# Patient Record
Sex: FEMALE | Race: WHITE | NOT HISPANIC OR LATINO | Employment: FULL TIME | URBAN - METROPOLITAN AREA
[De-identification: names, ages, dates, MRNs, and addresses within clinical notes are randomized per-mention and may not be internally consistent; named-entity substitution may affect disease eponyms.]

---

## 2023-06-13 ENCOUNTER — GYNECOLOGY VISIT (OUTPATIENT)
Dept: OBGYN | Facility: CLINIC | Age: 23
End: 2023-06-13
Payer: COMMERCIAL

## 2023-06-13 ENCOUNTER — HOSPITAL ENCOUNTER (OUTPATIENT)
Facility: MEDICAL CENTER | Age: 23
End: 2023-06-13
Attending: PHYSICIAN ASSISTANT
Payer: COMMERCIAL

## 2023-06-13 VITALS — WEIGHT: 242.6 LBS | DIASTOLIC BLOOD PRESSURE: 100 MMHG | SYSTOLIC BLOOD PRESSURE: 140 MMHG

## 2023-06-13 DIAGNOSIS — Z78.9 ATTEMPTING TO CONCEIVE: ICD-10-CM

## 2023-06-13 DIAGNOSIS — N39.41 URGE INCONTINENCE: ICD-10-CM

## 2023-06-13 DIAGNOSIS — N93.9 ABNORMAL UTERINE BLEEDING (AUB): ICD-10-CM

## 2023-06-13 DIAGNOSIS — Z31.69 PRE-CONCEPTION COUNSELING: ICD-10-CM

## 2023-06-13 PROCEDURE — 3077F SYST BP >= 140 MM HG: CPT | Performed by: PHYSICIAN ASSISTANT

## 2023-06-13 PROCEDURE — 3080F DIAST BP >= 90 MM HG: CPT | Performed by: PHYSICIAN ASSISTANT

## 2023-06-13 PROCEDURE — 87086 URINE CULTURE/COLONY COUNT: CPT

## 2023-06-13 PROCEDURE — 99204 OFFICE O/P NEW MOD 45 MIN: CPT | Performed by: PHYSICIAN ASSISTANT

## 2023-06-13 RX ORDER — AMITRIPTYLINE HYDROCHLORIDE 25 MG/1
25 TABLET, FILM COATED ORAL NIGHTLY
COMMUNITY

## 2023-06-13 RX ORDER — OMEPRAZOLE 40 MG/1
40 CAPSULE, DELAYED RELEASE ORAL DAILY
COMMUNITY

## 2023-06-13 RX ORDER — SUMATRIPTAN 100 MG/1
100 TABLET, FILM COATED ORAL
COMMUNITY
End: 2023-12-13

## 2023-06-13 RX ORDER — CYCLOBENZAPRINE HCL 5 MG
5-10 TABLET ORAL 3 TIMES DAILY PRN
COMMUNITY
End: 2023-12-13

## 2023-06-13 NOTE — PROGRESS NOTES
ANNUAL GYNECOLOGY VISIT    Chief Complaint  Irregular Menses and Infertility      Subjective  Gertrudis Mata is a 22 y.o. female No obstetric history on file. Patient's last menstrual period was 05/29/2023 (exact date). using nothing for contraception who presents today for Annual Exam.     TTC  Pt reports she has been trying to conceive x 1 year. Tracking cycles menses are q24-36d lasting 5-8 days with dysmenorrhea and  heavy bleeding the whole cycle. Uses a menstrual cup and pad which she changes 3x daily, cannot use tampons due to heavy madonna. Uses heating pads and NSAIDs with minimal relief. Pt also c/o dyspareunia x 4-5 months. No known trauma to the area. Not tracking ovulation but pays attention to changes in discharge which does not happen at the same time each cycle. Pt does c/o acne and weight gain but no hirsutism or hair loss. Previously on depo, stopped 8/2022.    Urinary sx  Pt c/o urgent and frequent urination with some  urinary leakage, wears liner. Minimal stress incontinence. Unable to completely empty. No hx of UTIs.       Preventive Care   Immunization History   Administered Date(s) Administered    PFIZER GRAY CAP SARS-COV-2 VACCINATION (12+) 01/13/2022    PFIZER PURPLE CAP SARS-COV-2 VACCINATION (12+) 12/17/2021       Guardasil HPV vaccine: UTD    Gynecology History and ROS  Current Sexual Activity: yes - monogamous male partner   History of sexually transmitted diseases? no  Abnormal discharge? no  Current Contraception:  none     Menstrual History  Patient's last menstrual period was 05/29/2023 (exact date).  Periods are irregular  q 24-36 days, lasting 5-8 days.   Clots or heavy flow: yes - whole cycle  Dysmenorrhea: yes - pelvic pain radiating to back and vaginal area  Intermenstrual bleeding/spotting: no  Significant pain with periods:yes - sharp pelvic pain   Bothersome PMS symptoms: no  Significant Pelvic Pain: yes -  with menses only    Pap History  Last pap smear: n/a due to age    History of moderate or severe dysplasia: n/a    Cancer Risk Assessement:  Family history of:   - Breast cancer: MGM   - Ovarian cancer: no   - Uterine cancer: no   - Colon cancer: no   - Endometrial cancer: sister     Obstetric History  OB History    Para Term  AB Living   0 0 0 0 0 0   SAB IAB Ectopic Molar Multiple Live Births   0 0 0 0 0 0       Past Medical History  Past Medical History:   Diagnosis Date    Anxiety     Depression     Joint dislocation     Migraine        Past Surgical History  Past Surgical History:   Procedure Laterality Date    EYE SURGERY Left        Social History  Social History     Tobacco Use    Smoking status: Former     Types: Cigarettes     Passive exposure: Past    Smokeless tobacco: Never   Vaping Use    Vaping Use: Every day    Substances: Nicotine    Devices: Disposable   Substance Use Topics    Drug use: Never        Family History  Family History   Problem Relation Age of Onset    No Known Problems Mother     Endometrial Cancer Sister     Breast Cancer Maternal Grandmother        Home Medications  Current Outpatient Medications   Medication Sig    omeprazole (PRILOSEC) 40 MG delayed-release capsule Take 40 mg by mouth every day.    sumatriptan (IMITREX) 100 MG tablet Take 100 mg by mouth.    cyclobenzaprine (FLEXERIL) 5 mg tablet Take 5-10 mg by mouth 3 times a day as needed.    amitriptyline (ELAVIL) 25 MG Tab Take 25 mg by mouth every evening.       Allergies/Reactions  Not on File    ROS  Positive ROS: see HPI  Gen: no fevers or chills, no significant weight loss or gain, excessive fatigue  Respiratory:  no cough or dyspnea  Cardiac:  no chest pain, no palpitations, no syncope  Breast: no breast discharge, pain, lump or skin changes  GI:  no heartburn, no abdominal pain, no nausea or vomiting  Urinary: no dysuria, urgency, frequency, incontinence   Psych: no depression or anxiety  Neuro: no migraines with aura, fainting spells, numbness or  tingling  Extremities: no joint pain, persistently swollen ankles, recurrent leg cramps      Physical Examination:  Vital Signs: BP (!) 140/100   Wt 242 lb 9.6 oz   LMP 05/29/2023 (Exact Date)       Constitutional: The patient is well developed and well nourished.  Psychiatric: Patient is oriented to time place and person.   Skin: No rash observed.  Neck: Appears symmetric. Thyroid normal size  Respiratory: normal effort  Breast: Inspection reveals no asymetry or nipple discharge, no skin thickening, dimpling or erythema.  Palpation demonstrates no masses.  Abdomen: Soft, non-tender.  Pelvic Exam:      Vulva: external female genitalia are normal in appearance. No lesions     Urethra - no lesions, no erythema     Vagina: moist, pink, normal ruggae     Cervix: pink, smooth, no lesions, no CMT     Uterus - non-tender, normal size, shape, contour, mobile, anteverted     Ovaries: non-tender, no appreciable masses    Pap Smear performed: n/a    Chaperone Present: POP Cuadra   Extremeties: Legs are symmetric and without tenderness. There is no edema present.        Assessment & Plan  Gertrudis Mata is a 22 y.o. female who presents today for Annual Gyn Exam.     1. Abnormal uterine bleeding (AUB)    - Labs ordered to r/o underlying pathology. Reviewed techniques to aid conception - health diet, regular exercise avoid caffeine and alcohol. Discussed methods and frequency for trying and ovulation testing. Encouraged pt to make appointment at [facility].   - CBC WITH DIFFERENTIAL; PROLACTIN; TSH WITH REFLEX TO FT4; ESTRADIOL; FSH/LH; PROGESTERONE; TESTOSTERONE F&T FEMALES/CHILD;   - US-PELVIC COMPLETE (TRANSABDOMINAL/TRANSVAGINAL) (COMBO);     2. Attempting to conceive    - ANTI-MULLERIAN HORMONE([facility]); Future    3. Urge incontinence    - Referral to Urogynecology  - URINE CULTURE(NEW); Future      Anticipatory guidance given. Encouraged adequate water intake, healthy diet, regular  exercise. Educated on Pap smear screening and guidelines for age per ACOG and ASSCP. Advised she will need first Pap at 23y. Discussed safe sex, STI prevention, contraception/family planning. Self breast exam taught.       Return: Annually or PRN    Sharon Reyna P.A.-C.

## 2023-06-16 LAB
BACTERIA UR CULT: NORMAL
SIGNIFICANT IND 70042: NORMAL
SITE SITE: NORMAL
SOURCE SOURCE: NORMAL

## 2023-06-21 ENCOUNTER — TELEPHONE (OUTPATIENT)
Dept: OBGYN | Facility: CLINIC | Age: 23
End: 2023-06-21
Payer: COMMERCIAL

## 2023-06-21 NOTE — TELEPHONE ENCOUNTER
----- Message from Sharon Reyna P.A.-C. sent at 6/16/2023 12:45 PM PDT -----  Please let pt know urine culture was negative. Continue with plan to see Dr Salazar.       2nd attempt  06/21/23  1514 Left message for pt to call back regarding lab results.   6/26/2023   0839 pt called back and left message to call her back.   06/27/23  1041 Left message for pt to call back regarding lab results.   07/03/23  Not able to contact pt. Letter sent with information above.

## 2023-06-21 NOTE — LETTER
July 3, 2023        Gertrudis Mata  [address]  [place] [address]        Dear Gertrudis    Here are the results of your test(s):   Urine culture: negative    Comments:  Please continue with plan to see Dr. Salazar. Please call our office to schedule an appointment. We tried to contact you  but we were unsuccessful. Our phone number is [phone].         Sincerely,    Sharon Chen R.N.  Signed Electronically

## 2023-06-26 ENCOUNTER — TELEPHONE (OUTPATIENT)
Dept: OBGYN | Facility: CLINIC | Age: 23
End: 2023-06-26

## 2023-06-26 NOTE — TELEPHONE ENCOUNTER
Please let pt know urine culture was negative. Continue with plan to see Dr Salazar.     Per Sharon ZAMUDIO     Pt informed and understood

## 2023-07-17 ENCOUNTER — APPOINTMENT (OUTPATIENT)
Dept: RADIOLOGY | Facility: MEDICAL CENTER | Age: 23
End: 2023-07-17
Attending: PHYSICIAN ASSISTANT
Payer: COMMERCIAL

## 2023-07-17 DIAGNOSIS — N93.9 ABNORMAL UTERINE BLEEDING (AUB): ICD-10-CM

## 2023-07-17 PROCEDURE — 76830 TRANSVAGINAL US NON-OB: CPT

## 2023-07-20 ENCOUNTER — TELEPHONE (OUTPATIENT)
Dept: OBGYN | Facility: CLINIC | Age: 23
End: 2023-07-20
Payer: COMMERCIAL

## 2023-07-20 NOTE — TELEPHONE ENCOUNTER
----- Message from Sharon Reyna P.A.-C. sent at 7/19/2023 12:44 PM PDT -----  Please let pt know US looks great, no abnormalities or follow up needed.   I did refer her to Dr Salazar but dont see her scheduled. If she want to schedule with him still for the incontinence please help her with that.   Sharon Reyna P.A.-C.      07/20/23  1326 Left message for pt to call back regarding US results.

## 2023-07-24 NOTE — TELEPHONE ENCOUNTER
Caller Name: Gertrudis Mata   Call Back Number: [phone] (home)       How would the patient prefer to be contacted with a response: Phone call do NOT leave a detailed message    Pt advised of US results. Pt DECLINES incontinence appointment with Dr. Salazar at this time. If you have any questions please reach out to the Pt at the number above. Thank you.

## 2023-08-18 ENCOUNTER — HOSPITAL ENCOUNTER (OUTPATIENT)
Dept: LAB | Facility: MEDICAL CENTER | Age: 23
End: 2023-08-18
Attending: PHYSICIAN ASSISTANT
Payer: COMMERCIAL

## 2023-08-18 LAB
BASOPHILS # BLD AUTO: 0.9 % (ref 0–1.8)
BASOPHILS # BLD: 0.07 K/UL (ref 0–0.12)
EOSINOPHIL # BLD AUTO: 0.18 K/UL (ref 0–0.51)
EOSINOPHIL NFR BLD: 2.3 % (ref 0–6.9)
ERYTHROCYTE [DISTWIDTH] IN BLOOD BY AUTOMATED COUNT: 40.9 FL (ref 35.9–50)
HCT VFR BLD AUTO: 40.2 % (ref 37–47)
HGB BLD-MCNC: 13.4 G/DL (ref 12–16)
IMM GRANULOCYTES # BLD AUTO: 0.02 K/UL (ref 0–0.11)
IMM GRANULOCYTES NFR BLD AUTO: 0.3 % (ref 0–0.9)
LYMPHOCYTES # BLD AUTO: 4.42 K/UL (ref 1–4.8)
LYMPHOCYTES NFR BLD: 55.5 % (ref 22–41)
MCH RBC QN AUTO: 29.3 PG (ref 27–33)
MCHC RBC AUTO-ENTMCNC: 33.3 G/DL (ref 32.2–35.5)
MCV RBC AUTO: 87.8 FL (ref 81.4–97.8)
MONOCYTES # BLD AUTO: 0.51 K/UL (ref 0–0.85)
MONOCYTES NFR BLD AUTO: 6.4 % (ref 0–13.4)
NEUTROPHILS # BLD AUTO: 2.77 K/UL (ref 1.82–7.42)
NEUTROPHILS NFR BLD: 34.6 % (ref 44–72)
NRBC # BLD AUTO: 0 K/UL
NRBC BLD-RTO: 0 /100 WBC (ref 0–0.2)
PLATELET # BLD AUTO: 293 K/UL (ref 164–446)
PMV BLD AUTO: 10.2 FL (ref 9–12.9)
RBC # BLD AUTO: 4.58 M/UL (ref 4.2–5.4)
T4 FREE SERPL-MCNC: 1.16 NG/DL (ref 0.93–1.7)
TSH SERPL DL<=0.005 MIU/L-ACNC: 6.57 UIU/ML (ref 0.38–5.33)
WBC # BLD AUTO: 8 K/UL (ref 4.8–10.8)

## 2023-08-18 PROCEDURE — 82670 ASSAY OF TOTAL ESTRADIOL: CPT

## 2023-08-18 PROCEDURE — 36415 COLL VENOUS BLD VENIPUNCTURE: CPT

## 2023-08-18 PROCEDURE — 84403 ASSAY OF TOTAL TESTOSTERONE: CPT

## 2023-08-18 PROCEDURE — 83001 ASSAY OF GONADOTROPIN (FSH): CPT

## 2023-08-18 PROCEDURE — 84439 ASSAY OF FREE THYROXINE: CPT

## 2023-08-18 PROCEDURE — 83002 ASSAY OF GONADOTROPIN (LH): CPT

## 2023-08-18 PROCEDURE — 84270 ASSAY OF SEX HORMONE GLOBUL: CPT

## 2023-08-18 PROCEDURE — 84144 ASSAY OF PROGESTERONE: CPT

## 2023-08-18 PROCEDURE — 83520 IMMUNOASSAY QUANT NOS NONAB: CPT

## 2023-08-18 PROCEDURE — 84443 ASSAY THYROID STIM HORMONE: CPT

## 2023-08-18 PROCEDURE — 84146 ASSAY OF PROLACTIN: CPT

## 2023-08-18 PROCEDURE — 85025 COMPLETE CBC W/AUTO DIFF WBC: CPT

## 2023-08-18 PROCEDURE — 84402 ASSAY OF FREE TESTOSTERONE: CPT

## 2023-08-19 LAB
ESTRADIOL SERPL-MCNC: 19.1 PG/ML
FSH SERPL-ACNC: 6.9 MIU/ML
LH SERPL-ACNC: 4.2 IU/L
PROGEST SERPL-MCNC: 0.34 NG/ML
PROLACTIN SERPL-MCNC: 22.7 NG/ML (ref 2.8–26)

## 2023-08-23 LAB — MIS SERPL-MCNC: 3.37 NG/ML (ref 0.4–16.02)

## 2023-08-28 LAB
SHBG SERPL-SCNC: 32 NMOL/L (ref 25–122)
TESTOST FREE SERPL-MCNC: 4.5 PG/ML (ref 0.8–7.4)
TESTOST SERPL-MCNC: 27 NG/DL (ref 9–55)

## 2023-08-29 DIAGNOSIS — R79.89 ABNORMAL THYROID BLOOD TEST: ICD-10-CM

## 2023-09-11 ENCOUNTER — TELEPHONE (OUTPATIENT)
Dept: OBGYN | Facility: CLINIC | Age: 23
End: 2023-09-11
Payer: COMMERCIAL

## 2023-09-11 NOTE — TELEPHONE ENCOUNTER
9/11/2023 1425  Pt called to see if she needed a new order for redraw of TSH with reflex to FT4 and when she should get it drawn. Informed pt that there is a new order in for the lab draw and she could go to any [facility] lab to get it drawn and they should be able to access the order. According to the [facility] message on 8/29/2023 by FLORENCIO Kline, informed pt the lab should be drawn 4 weeks from then, near end of September. Pt agreed and verbalized understanding.

## 2023-10-02 ENCOUNTER — APPOINTMENT (OUTPATIENT)
Dept: LAB | Facility: MEDICAL CENTER | Age: 23
End: 2023-10-02
Payer: COMMERCIAL

## 2023-10-17 ENCOUNTER — HOSPITAL ENCOUNTER (OUTPATIENT)
Dept: LAB | Facility: MEDICAL CENTER | Age: 23
End: 2023-10-17
Attending: PHYSICIAN ASSISTANT
Payer: COMMERCIAL

## 2023-10-17 DIAGNOSIS — R79.89 ABNORMAL THYROID BLOOD TEST: ICD-10-CM

## 2023-10-17 LAB — TSH SERPL DL<=0.005 MIU/L-ACNC: 2.86 UIU/ML (ref 0.38–5.33)

## 2023-10-17 PROCEDURE — 36415 COLL VENOUS BLD VENIPUNCTURE: CPT

## 2023-10-17 PROCEDURE — 84443 ASSAY THYROID STIM HORMONE: CPT

## 2023-10-18 ENCOUNTER — TELEPHONE (OUTPATIENT)
Dept: OBGYN | Facility: CLINIC | Age: 23
End: 2023-10-18
Payer: COMMERCIAL

## 2023-10-18 DIAGNOSIS — Z32.01 POSITIVE PREGNANCY TEST: ICD-10-CM

## 2023-10-18 NOTE — PROGRESS NOTES
Pt with positive home pregnancy test, has been TTC for >1y. HCG quant ordered to confirm.   Sharon Reyna P.A.-C.

## 2023-10-18 NOTE — TELEPHONE ENCOUNTER
Pt calling stating she has been having issues conceiving, but she took 2 UPTs at home yesterday and both were Positive and she is a week late on her period. Pt states she saw Sharon Reyna PA-C a few months ago and she is asking if provider can order blood work to confirm she is pregnant. Explained to pt this RN will consult with Sharon Reyna PA-C and will call her rico with an answer. Pt agreed and verbalized understanding.   1100 Consulted with Sharon Reyna PA-C and agreed to order Beta HCG lab work and we will f/u with an US if results double on the second draw.   1104 Left message for pt to call back regarding above message.   10/19/23  1403 Left message for pt to call back regarding above message.   [facility] message sent with above info.   Pt saw [facility] message on 10/19/2023 at 6684

## 2023-10-23 ENCOUNTER — HOSPITAL ENCOUNTER (OUTPATIENT)
Dept: LAB | Facility: MEDICAL CENTER | Age: 23
End: 2023-10-23
Attending: PHYSICIAN ASSISTANT
Payer: COMMERCIAL

## 2023-10-23 DIAGNOSIS — Z32.01 POSITIVE PREGNANCY TEST: ICD-10-CM

## 2023-10-23 LAB — B-HCG SERPL-ACNC: ABNORMAL MIU/ML (ref 0–5)

## 2023-10-23 PROCEDURE — 84702 CHORIONIC GONADOTROPIN TEST: CPT

## 2023-10-23 PROCEDURE — 36415 COLL VENOUS BLD VENIPUNCTURE: CPT

## 2023-10-25 ENCOUNTER — HOSPITAL ENCOUNTER (OUTPATIENT)
Dept: LAB | Facility: MEDICAL CENTER | Age: 23
End: 2023-10-25
Attending: PHYSICIAN ASSISTANT
Payer: COMMERCIAL

## 2023-10-25 DIAGNOSIS — Z32.01 POSITIVE PREGNANCY TEST: ICD-10-CM

## 2023-10-25 LAB — B-HCG SERPL-ACNC: ABNORMAL MIU/ML (ref 0–5)

## 2023-10-25 PROCEDURE — 84702 CHORIONIC GONADOTROPIN TEST: CPT

## 2023-10-25 PROCEDURE — 36415 COLL VENOUS BLD VENIPUNCTURE: CPT

## 2023-10-31 ENCOUNTER — TELEPHONE (OUTPATIENT)
Dept: OBGYN | Facility: CLINIC | Age: 23
End: 2023-10-31
Payer: COMMERCIAL

## 2023-10-31 NOTE — TELEPHONE ENCOUNTER
----- Message from Sharon Reyna P.A.-C. sent at 10/31/2023  9:11 AM PDT -----  It doesn't look like anyone called her on her HCG results while I was gone. Her HCG doubled over 2 days which is great! Can we make her an appt for /NOB at around 8-10 weeks?       10/31/23  1611 Left message for pt to call back regarding lab results.   [facility] message sent with above information.   1630 pt called back and left message to call her back.   1631 called pt back and informed as above. Pt transferred to Debbie ANDRES to schedule /NOB appt. Pt agreed and verbalized understanding.

## 2023-11-16 ENCOUNTER — INITIAL PRENATAL (OUTPATIENT)
Dept: OBGYN | Facility: CLINIC | Age: 23
End: 2023-11-16
Payer: COMMERCIAL

## 2023-11-16 VITALS — DIASTOLIC BLOOD PRESSURE: 83 MMHG | SYSTOLIC BLOOD PRESSURE: 118 MMHG | WEIGHT: 246 LBS

## 2023-11-16 DIAGNOSIS — Z34.81 ENCOUNTER FOR SUPERVISION OF OTHER NORMAL PREGNANCY, FIRST TRIMESTER: ICD-10-CM

## 2023-11-16 DIAGNOSIS — N92.6 MISSED MENSES: Primary | ICD-10-CM

## 2023-11-16 DIAGNOSIS — Z32.01 PREGNANCY TEST POSITIVE: ICD-10-CM

## 2023-11-16 DIAGNOSIS — R79.89 ELEVATED TSH: ICD-10-CM

## 2023-11-16 PROBLEM — O30.049 DICHORIONIC DIAMNIOTIC TWIN GESTATION: Status: ACTIVE | Noted: 2023-11-16

## 2023-11-16 PROCEDURE — 3079F DIAST BP 80-89 MM HG: CPT | Performed by: OBSTETRICS & GYNECOLOGY

## 2023-11-16 PROCEDURE — 99214 OFFICE O/P EST MOD 30 MIN: CPT | Mod: 25 | Performed by: OBSTETRICS & GYNECOLOGY

## 2023-11-16 PROCEDURE — 76830 TRANSVAGINAL US NON-OB: CPT | Performed by: OBSTETRICS & GYNECOLOGY

## 2023-11-16 PROCEDURE — 3074F SYST BP LT 130 MM HG: CPT | Performed by: OBSTETRICS & GYNECOLOGY

## 2023-11-16 NOTE — PROGRESS NOTES
CC: Missed menses and +UPT    Gertrudis Mata is a 23 y.o.  who presents due to missed menses with a +UPT. Patient's last menstrual period was 2023..  She is sure of her LMP.  Based on LMP, she is 9w0d today. She was not using anything for birthcontrol.  This is was a planned pregnancy.  She reports nausea, reports vomiting, denies vaginal bleeding/spotting, and denies cramping.        OB History:    OB History    Para Term  AB Living   1 0 0 0 0 0   SAB IAB Ectopic Molar Multiple Live Births   0 0 0 0 0 0      # Outcome Date GA Lbr Dakota/2nd Weight Sex Delivery Anes PTL Lv   1 Current                    Objective:   Vitals:  /83   Wt 246 lb   There is no height or weight on file to calculate BMI. (Goal BM I>18 <25)  Exam:  General: is in no apparent distress  Psychiatric: appropriate affect, alert and oriented x3, intact judgment and insight.  Respiratory: normal effort  Female GYN: normal female external genitalia without lesions      Procedure:  Transvaginal US performed by me and per my read:    Indication: Amenorrhea, +UPT.     Findings:   Di/di twin intrauterine pregnancy @ 9w3d by CRL.   Positive yolk sacs  Positive fetal cardiac activity    Right ovary wnl.   Left Ovary not visualized .   No free fluid in the cul-de-sac.    Impression:   Viable IUP @ 9w3d.  MEGA by US of 24.  MEGA by LMP: 24  Final MEGA: 24      A/P:   23 y.o.  here for pregnancy confirmation visit    Amenorrhea due to pregnancy.     - US today is c/w LMP. Final MEGA of 24.     - Normal pregnancy s/sx discussed   - Advised prenatal vitamins, healthy well rounded diet, adequate hydration, and continued exercise.     - Labs:     - PNL ordered     - Counseled on aneuploidy and carrier screening tests:   She currently accepted aneuploidy screenings and declined carrier screening   - SAB precautions discussed.   - Discussed the size of our practice and that she will see multiple  providers during the course of her care. She expresses understanding.    - Reviewed starting treatment for routine N/V of pregnancy if present with B6 TID and Unisom nightly. Call for Rx if this is not adequately treating N/V.    2. Di/Di twin gestation    Follow up in 4 weeks for new OB visit.  Needs pap and gc/ct at next visit    Total Time: 30 minutes spent in chart review, exam, counseling and documention      Ivana Pettit D.O.

## 2023-12-13 ENCOUNTER — ROUTINE PRENATAL (OUTPATIENT)
Dept: OBGYN | Facility: CLINIC | Age: 23
End: 2023-12-13
Payer: COMMERCIAL

## 2023-12-13 ENCOUNTER — HOSPITAL ENCOUNTER (OUTPATIENT)
Facility: MEDICAL CENTER | Age: 23
End: 2023-12-13
Attending: OBSTETRICS & GYNECOLOGY
Payer: COMMERCIAL

## 2023-12-13 VITALS
BODY MASS INDEX: 41.66 KG/M2 | DIASTOLIC BLOOD PRESSURE: 86 MMHG | WEIGHT: 244 LBS | HEIGHT: 64 IN | SYSTOLIC BLOOD PRESSURE: 137 MMHG

## 2023-12-13 DIAGNOSIS — O99.210 OBESITY IN PREGNANCY: ICD-10-CM

## 2023-12-13 DIAGNOSIS — M25.20 JOINT LAXITY: ICD-10-CM

## 2023-12-13 DIAGNOSIS — Z12.4 SCREENING FOR MALIGNANT NEOPLASM OF CERVIX: ICD-10-CM

## 2023-12-13 DIAGNOSIS — Z11.3 SCREEN FOR STD (SEXUALLY TRANSMITTED DISEASE): ICD-10-CM

## 2023-12-13 DIAGNOSIS — O30.041 DICHORIONIC DIAMNIOTIC TWIN PREGNANCY IN FIRST TRIMESTER: Primary | ICD-10-CM

## 2023-12-13 DIAGNOSIS — G43.809 OTHER MIGRAINE WITHOUT STATUS MIGRAINOSUS, NOT INTRACTABLE: ICD-10-CM

## 2023-12-13 PROBLEM — G43.909 MIGRAINE WITHOUT STATUS MIGRAINOSUS, NOT INTRACTABLE: Status: ACTIVE | Noted: 2023-12-13

## 2023-12-13 PROCEDURE — 87491 CHLMYD TRACH DNA AMP PROBE: CPT

## 2023-12-13 PROCEDURE — 0501F PRENATAL FLOW SHEET: CPT | Performed by: OBSTETRICS & GYNECOLOGY

## 2023-12-13 PROCEDURE — 88142 CYTOPATH C/V THIN LAYER: CPT

## 2023-12-13 PROCEDURE — 3075F SYST BP GE 130 - 139MM HG: CPT | Performed by: OBSTETRICS & GYNECOLOGY

## 2023-12-13 PROCEDURE — 3079F DIAST BP 80-89 MM HG: CPT | Performed by: OBSTETRICS & GYNECOLOGY

## 2023-12-13 PROCEDURE — 87591 N.GONORRHOEAE DNA AMP PROB: CPT

## 2023-12-13 NOTE — PROGRESS NOTES
Establish Pregnancy Visit    CC: First OB Visit    HPI: Patient is a 23 y.o.  at 12w6d who presents for her first OB visit.  She is not yet feeling fetal movement.  She denies vaginal bleeding, denies leakage of fluid, denies contractions.   She denies nausea/vomiting, headaches, or urinary symptoms.      DATING:   Estimated Date of Delivery: 24  LMP (c/w 9wk US)    GYN HX:   Last Pap: never, age 23  Hx Moderate or Severe Dysplasia : no  Hx STD : no    OBSTETRIC HISTORY:  OB History    Para Term  AB Living   1 0 0 0 0 0   SAB IAB Ectopic Molar Multiple Live Births   0 0 0 0 0 0      # Outcome Date GA Lbr Dakota/2nd Weight Sex Delivery Anes PTL Lv   1 Current                MEDICAL HISTORY:  Past Medical History:   Diagnosis Date    Anxiety     Depression     Joint dislocation     Migraine        MEDICATIONS:  Current Outpatient Medications on File Prior to Visit   Medication Sig Dispense Refill    omeprazole (PRILOSEC) 40 MG delayed-release capsule Take 40 mg by mouth every day.      amitriptyline (ELAVIL) 25 MG Tab Take 25 mg by mouth every evening.       No current facility-administered medications on file prior to visit.       FAMILY HISTORY:  Family History   Problem Relation Age of Onset    No Known Problems Mother     Endometrial Cancer Sister     Breast Cancer Maternal Grandmother        SURGICAL HISTORY:  Past Surgical History:   Procedure Laterality Date    EYE SURGERY Left        ALLERGIES / REACTIONS:  No Known Allergies             SOCIAL HISTORY:   reports that she has quit smoking. Her smoking use included cigarettes. She has been exposed to tobacco smoke. She has never used smokeless tobacco. She reports that she does not currently use alcohol. She reports that she does not use drugs.    ROS:   Gen: no fevers or chills, no significant weight loss or gain, excessive fatigue  Respiratory:  no cough or dyspnea  Cardiac:  no chest pain, no palpitations, no syncope  Breast: no  "breast discharge, pain, lump or skin changes  GI:  no heartburn, no abdominal pain, no nausea or vomiting  Urinary: no dysuria, urgency, frequency, incontinence   Psych: no depression or anxiety  Neuro: no migraines with aura, fainting spells, numbness or tingling  Extremities: no joint pain, persistently swollen ankles, recurrent leg cramps         PHYSICAL EXAMINATION:  Vital Signs:   Vitals:    12/13/23 1352 12/13/23 1413   BP: 137/86    Weight: 244 lb    Height:  5' 4\"     Body mass index is 41.88 kg/m².  Constitutional: The patient is well developed and well nourished.  Psychiatric: Patient is oriented to time place and person.   Skin: No rash observed.  Neck: Neck appears symmetric. There are no masses or adenopathy present.  Respiratory: normal effort  Abdomen: Soft, non-tender.  Pelvic:    Vulva: normal.    Urethra: normal.   Vagina: normal.    Cervix: normal.    Uterus: consistent with dates    Adnexa: normal.   Perineum: normal.   GC / Chlamydia cultures obtained.   Pap Smear Obtained: yes  Extremeties: Legs are symmetric and without tenderness. There is no edema present.    ACOG SCREENING  Infection Prevention  1. High Risk For HIV: No 6. Rash Or Illness Since LMP: No     2. High Risk For Hepatitis B or C: No 7. History Of STD, GC, Chlamydia, HPV Syphilis: No     3. Live With Someone With TB Or Exposed To TB: No 8. Have a cat in the home?: No     4. Patient Or Partner Has A History Of Herpes: No      5. History of Chicken Pox: No             Genetic Screening/Teratology Counseling- Includes patient, baby's father, or anyone in either family with:  Patient's age 35 years or older as of estimated date of delivery: No     Thalassemia (Italian, Greek, Mediterranean, or  background): MCV less than 80: No     Neural tube defect (Meningomyelocele, Spina bifida, or Anencephaly): No     Congenital heart defect: No     Down syndrome: No     Reynold-Sachs (Ashkenazi Mandaen, Cajun, Italian [place] Islander): No     Canavan " disease (Ashkenazi Faith): No     Familial dysautonomia (Ashkenazi Faith): No     Sickle cell disease or trait (): No     Hemophilia or other blood disorders: No     Muscular dystrophy: No    Cystic fibrosis: No     Ramona's chorea: No     Mental retardation/autism: No     Other inherited genetic or chromosomal disorder: No     Maternal metabolic disorder (eg. Type 1 diabetes, PKU): No     Patient or baby's father had child with birth defects not listed above: No     Recurrent pregnancy loss, or a stillbirth: No     Medications (including supplements, vitamins, herbs, or OTC drugs)/illicit/recreational drugs/alcohol since last menstrual period: No                 ASSESSMENT AND PLAN:  23 y.o.  at 12w6d     Pregnancy Problems (from 25 to present)       Problem Noted Resolved    Dichorionic diamniotic twin gestation 2023 by Ivana Pettit D.O. No    Priority:  High      Overview Addendum 2023  2:11 PM by Ivana Pettit D.O.     Recommend daily ASA for risk of preeclampsia (nulliparity, obesity, twins)    MFM referral 2023          Migraine without status migrainosus, not intractable 2023 by Ivana Pettit D.O. No    Priority:  Medium      Overview Signed 2023  2:08 PM by Ivana Pettit D.O.     States that it is controlled by nightly amitriptyline and if she doesn't take it she will inevitably have severe migraine the next day. Reviewed Category C classification with no human studies however may take if benefits outweigh risks.         Obesity in pregnancy 2023 by Ivana Pettit D.O. No    Priority:  Medium      Overview Addendum 2023  2:13 PM by Ivana Pettit D.O.     Pregravid BMI 42  Early gct ordered 2023          Joint laxity 2023 by Ivana Pettit D.O. No    Priority:  Medium      Overview Signed 2023  2:11 PM by Ivana Pettit D.O.     States she partly meets criteria for EDS but was never  officially diagnosed. Complaining of increased laxity already in pregnancy at 12wks. Refer to MFM                 1. Dichorionic diamniotic twin pregnancy in first trimester   - still needs to get PNL and NIPT done. Has lab appt tomorrow. GCT added to labs  - Referral to Perinatology    2. Other migraine without status migrainosus, not intractable  - Referral to Perinatology    3. Obesity in pregnancy  - GLUCOSE 1HR GESTATIONAL; Future  - Referral to Perinatology    4. Joint laxity  - Referral to Perinatology    5. Screening for malignant neoplasm of cervix  - THINPREP PAP W/CTNG; Future    6. Screen for STD (sexually transmitted disease)  - THINPREP PAP W/CTNG; Future        Return in 4 weeks for next prenatal visit    Ivana Pettit D.O.

## 2023-12-15 LAB
C TRACH RRNA CVX QL NAA+PROBE: NEGATIVE
CYTOLOGIST CVX/VAG CYTO: NORMAL
CYTOLOGY CVX/VAG DOC CYTO: NORMAL
N GONORRHOEA RRNA CVX QL NAA+PROBE: NEGATIVE
NOTE NL11727A: NORMAL
OTHER STN SPEC: NORMAL
STAT OF ADQ CVX/VAG CYTO-IMP: NORMAL

## 2023-12-19 ENCOUNTER — HOSPITAL ENCOUNTER (OUTPATIENT)
Dept: LAB | Facility: MEDICAL CENTER | Age: 23
End: 2023-12-19
Attending: OBSTETRICS & GYNECOLOGY
Payer: COMMERCIAL

## 2023-12-19 DIAGNOSIS — R79.89 ELEVATED TSH: ICD-10-CM

## 2023-12-19 DIAGNOSIS — Z32.01 PREGNANCY TEST POSITIVE: ICD-10-CM

## 2023-12-19 DIAGNOSIS — O99.210 OBESITY IN PREGNANCY: ICD-10-CM

## 2023-12-19 LAB
ABO GROUP BLD: NORMAL
BLD GP AB SCN SERPL QL: NORMAL
ERYTHROCYTE [DISTWIDTH] IN BLOOD BY AUTOMATED COUNT: 43.8 FL (ref 35.9–50)
GLUCOSE 1H P 50 G GLC PO SERPL-MCNC: 105 MG/DL (ref 70–139)
HBV SURFACE AG SER QL: ABNORMAL
HCT VFR BLD AUTO: 36 % (ref 37–47)
HCV AB SER QL: ABNORMAL
HGB BLD-MCNC: 12 G/DL (ref 12–16)
HIV 1+2 AB+HIV1 P24 AG SERPL QL IA: NORMAL
MCH RBC QN AUTO: 29.6 PG (ref 27–33)
MCHC RBC AUTO-ENTMCNC: 33.3 G/DL (ref 32.2–35.5)
MCV RBC AUTO: 88.9 FL (ref 81.4–97.8)
PLATELET # BLD AUTO: 272 K/UL (ref 164–446)
PMV BLD AUTO: 10.9 FL (ref 9–12.9)
RBC # BLD AUTO: 4.05 M/UL (ref 4.2–5.4)
RH BLD: NORMAL
RUBV AB SER QL: >500 IU/ML
T PALLIDUM AB SER QL IA: ABNORMAL
TSH SERPL DL<=0.005 MIU/L-ACNC: 2.37 UIU/ML (ref 0.38–5.33)
WBC # BLD AUTO: 10.5 K/UL (ref 4.8–10.8)

## 2023-12-19 PROCEDURE — 86762 RUBELLA ANTIBODY: CPT

## 2023-12-19 PROCEDURE — 36415 COLL VENOUS BLD VENIPUNCTURE: CPT

## 2023-12-19 PROCEDURE — 82950 GLUCOSE TEST: CPT

## 2023-12-19 PROCEDURE — 86850 RBC ANTIBODY SCREEN: CPT

## 2023-12-19 PROCEDURE — 86900 BLOOD TYPING SEROLOGIC ABO: CPT

## 2023-12-19 PROCEDURE — 80307 DRUG TEST PRSMV CHEM ANLYZR: CPT

## 2023-12-19 PROCEDURE — 86803 HEPATITIS C AB TEST: CPT

## 2023-12-19 PROCEDURE — 86780 TREPONEMA PALLIDUM: CPT

## 2023-12-19 PROCEDURE — 87340 HEPATITIS B SURFACE AG IA: CPT

## 2023-12-19 PROCEDURE — 86901 BLOOD TYPING SEROLOGIC RH(D): CPT

## 2023-12-19 PROCEDURE — 87077 CULTURE AEROBIC IDENTIFY: CPT

## 2023-12-19 PROCEDURE — 87086 URINE CULTURE/COLONY COUNT: CPT

## 2023-12-19 PROCEDURE — 85027 COMPLETE CBC AUTOMATED: CPT

## 2023-12-19 PROCEDURE — 87389 HIV-1 AG W/HIV-1&-2 AB AG IA: CPT

## 2023-12-19 PROCEDURE — 84443 ASSAY THYROID STIM HORMONE: CPT

## 2023-12-21 LAB
AMPHET CTO UR CFM-MCNC: NEGATIVE NG/ML
BACTERIA UR CULT: NORMAL
BARBITURATES CTO UR CFM-MCNC: NEGATIVE NG/ML
BENZODIAZ CTO UR CFM-MCNC: NEGATIVE NG/ML
CANNABINOIDS CTO UR CFM-MCNC: NEGATIVE NG/ML
COCAINE CTO UR CFM-MCNC: NEGATIVE NG/ML
CREAT UR-MCNC: 22.8 MG/DL (ref 20–400)
DRUG COMMENT 753798: NORMAL
METHADONE CTO UR CFM-MCNC: NEGATIVE NG/ML
OPIATES CTO UR CFM-MCNC: NEGATIVE NG/ML
PCP CTO UR CFM-MCNC: NEGATIVE NG/ML
PROPOXYPH CTO UR CFM-MCNC: NEGATIVE NG/ML
SIGNIFICANT IND 70042: NORMAL
SITE SITE: NORMAL
SOURCE SOURCE: NORMAL

## 2024-02-06 ENCOUNTER — ANCILLARY PROCEDURE (OUTPATIENT)
Dept: MATERNAL FETAL MEDICINE | Facility: MEDICAL CENTER | Age: 24
End: 2024-02-06
Attending: OBSTETRICS & GYNECOLOGY
Payer: COMMERCIAL

## 2024-02-06 VITALS
WEIGHT: 260 LBS | DIASTOLIC BLOOD PRESSURE: 88 MMHG | SYSTOLIC BLOOD PRESSURE: 145 MMHG | HEART RATE: 99 BPM | BODY MASS INDEX: 44.63 KG/M2

## 2024-02-06 DIAGNOSIS — O30.041 DICHORIONIC DIAMNIOTIC TWIN PREGNANCY IN FIRST TRIMESTER: ICD-10-CM

## 2024-02-06 DIAGNOSIS — G43.809 OTHER MIGRAINE, NOT INTRACTABLE, WITHOUT STATUS MIGRAINOSUS: ICD-10-CM

## 2024-02-06 DIAGNOSIS — O99.210 OBESITY IN PREGNANCY: ICD-10-CM

## 2024-02-06 DIAGNOSIS — M25.20 JOINT LAXITY: ICD-10-CM

## 2024-02-06 PROCEDURE — 76812 OB US DETAILED ADDL FETUS: CPT | Performed by: OBSTETRICS & GYNECOLOGY

## 2024-02-06 PROCEDURE — 99214 OFFICE O/P EST MOD 30 MIN: CPT | Performed by: OBSTETRICS & GYNECOLOGY

## 2024-02-06 PROCEDURE — 76817 TRANSVAGINAL US OBSTETRIC: CPT | Performed by: OBSTETRICS & GYNECOLOGY

## 2024-02-06 PROCEDURE — 76811 OB US DETAILED SNGL FETUS: CPT | Performed by: OBSTETRICS & GYNECOLOGY

## 2024-03-05 ENCOUNTER — ANCILLARY PROCEDURE (OUTPATIENT)
Dept: MATERNAL FETAL MEDICINE | Facility: MEDICAL CENTER | Age: 24
End: 2024-03-05
Attending: OBSTETRICS & GYNECOLOGY
Payer: COMMERCIAL

## 2024-03-05 DIAGNOSIS — O99.210 OBESITY IN PREGNANCY: ICD-10-CM

## 2024-03-05 DIAGNOSIS — M25.20 JOINT LAXITY: ICD-10-CM

## 2024-03-05 DIAGNOSIS — O30.049 DICHORIONIC DIAMNIOTIC TWIN PREGNANCY, ANTEPARTUM: ICD-10-CM

## 2024-03-05 DIAGNOSIS — G43.809 OTHER MIGRAINE, NOT INTRACTABLE, WITHOUT STATUS MIGRAINOSUS: ICD-10-CM

## 2024-03-05 PROCEDURE — 76816 OB US FOLLOW-UP PER FETUS: CPT | Performed by: OBSTETRICS & GYNECOLOGY

## 2024-04-02 ENCOUNTER — APPOINTMENT (OUTPATIENT)
Dept: MATERNAL FETAL MEDICINE | Facility: MEDICAL CENTER | Age: 24
End: 2024-04-02
Attending: OBSTETRICS & GYNECOLOGY
Payer: COMMERCIAL

## 2024-04-02 VITALS — SYSTOLIC BLOOD PRESSURE: 127 MMHG | WEIGHT: 277.1 LBS | DIASTOLIC BLOOD PRESSURE: 98 MMHG | BODY MASS INDEX: 47.56 KG/M2

## 2024-04-02 DIAGNOSIS — O30.049 DICHORIONIC DIAMNIOTIC TWIN PREGNANCY, ANTEPARTUM: ICD-10-CM

## 2024-04-02 DIAGNOSIS — O99.210 OBESITY IN PREGNANCY: ICD-10-CM

## 2024-04-02 DIAGNOSIS — G43.809 OTHER MIGRAINE, NOT INTRACTABLE, WITHOUT STATUS MIGRAINOSUS: ICD-10-CM

## 2024-04-02 DIAGNOSIS — M25.20 JOINT LAXITY: ICD-10-CM

## 2024-04-18 ENCOUNTER — PATIENT MESSAGE (OUTPATIENT)
Dept: OBGYN | Facility: CLINIC | Age: 24
End: 2024-04-18
Payer: COMMERCIAL

## 2024-04-18 DIAGNOSIS — O30.041 DICHORIONIC DIAMNIOTIC TWIN PREGNANCY IN FIRST TRIMESTER: ICD-10-CM

## 2024-04-23 ENCOUNTER — ROUTINE PRENATAL (OUTPATIENT)
Dept: OBGYN | Facility: CLINIC | Age: 24
End: 2024-04-23
Payer: COMMERCIAL

## 2024-04-23 VITALS — DIASTOLIC BLOOD PRESSURE: 88 MMHG | WEIGHT: 285.7 LBS | BODY MASS INDEX: 49.04 KG/M2 | SYSTOLIC BLOOD PRESSURE: 138 MMHG

## 2024-04-23 DIAGNOSIS — O30.043 DICHORIONIC DIAMNIOTIC TWIN PREGNANCY IN THIRD TRIMESTER: Primary | ICD-10-CM

## 2024-04-23 DIAGNOSIS — Z23 NEED FOR TDAP VACCINATION: ICD-10-CM

## 2024-04-23 DIAGNOSIS — O99.210 OBESITY IN PREGNANCY: ICD-10-CM

## 2024-04-23 PROCEDURE — 3079F DIAST BP 80-89 MM HG: CPT | Performed by: OBSTETRICS & GYNECOLOGY

## 2024-04-23 PROCEDURE — 90715 TDAP VACCINE 7 YRS/> IM: CPT | Performed by: OBSTETRICS & GYNECOLOGY

## 2024-04-23 PROCEDURE — 3075F SYST BP GE 130 - 139MM HG: CPT | Performed by: OBSTETRICS & GYNECOLOGY

## 2024-04-23 PROCEDURE — 90471 IMMUNIZATION ADMIN: CPT | Performed by: OBSTETRICS & GYNECOLOGY

## 2024-04-23 PROCEDURE — 0502F SUBSEQUENT PRENATAL CARE: CPT | Performed by: OBSTETRICS & GYNECOLOGY

## 2024-04-23 NOTE — PROGRESS NOTES
Pt. Here for OB/FU.   Reports Good FM.   Pt. Denies VB, LOF, or UC's.   Kick count: explained and gave sheet  Tdap: wants Tdap done today   Pt states no concerns   Phone/Pharm verified.

## 2024-04-23 NOTE — PROGRESS NOTES
Pt was here for a Tdap injection  NDC: 94322-522-31  Lot #:[id number]  Exp:2026  Dose:0.5 ml  Site: Right deltoid   Pt Educated, Name and  verified  Prior injection? Yes  Pt tolerated? Yes  Verified by:IP  Administered by: SHAYLA

## 2024-04-23 NOTE — LETTER
"Count Your Baby's Movements  Another step to a healthy delivery    Gertrudis Mata  [facility]  Dept: [phone]    How Many Weeks Pregnant: 31w5d    Date to Begin Countin23              How to use this chart    One way for your physician to keep track of your baby's health is by knowing how often the baby moves (or \"kicks\") in your womb.  You can help your physician to do this by using this chart every day.    Every day, you should see how many hours it takes for your baby to move 10 times.  Start in the morning, as soon as you get up.    First, write down the time your baby moves until you get to 10.  Check off one box every time your baby moves until you get to 10.  Write down the time you finished counting in the last column.  Total how long it took to count up all 10 movements.  Finally, fill in the box that shows how long this took.  After counting 10 movements, you no longer have to count any more that day.  The next morning, just start counting again as soon as you get up.    What should you call a \"movement\"?  It is hard to say, because it will feel different from one mother to another and from one pregnancy to the next.  The important thing is that you count the movements the same way throughout your pregnancy.  If you have more questions, you should ask your physician.    Count carefully every day!  SAMPLE:  Week 28    How many hours did it take to feel 10 movements?       Start  Time     1     2     3     4     5     6     7     8     9     10   Finish Time   Mon 8:20           11:40                  Fri               Sat               Sun                 IMPORTANT: You should contact your physician if it takes more than two hours for you to feel 10 movements.  Each morning, write down the time and start to count the movements of your baby.  Keep track by checking off one box every time you feel one movement.  When you " "have felt 10 \"kicks\", write down the time you finished counting in the last column.  Then fill in the   box (over the check valentino) for the number of hours it took.  Be sure to read the complete instructions on the previous page.            "

## 2024-04-29 ENCOUNTER — HOSPITAL ENCOUNTER (OUTPATIENT)
Dept: LAB | Facility: MEDICAL CENTER | Age: 24
End: 2024-04-29
Attending: OBSTETRICS & GYNECOLOGY
Payer: COMMERCIAL

## 2024-04-29 DIAGNOSIS — O30.041 DICHORIONIC DIAMNIOTIC TWIN PREGNANCY IN FIRST TRIMESTER: ICD-10-CM

## 2024-04-29 LAB
ERYTHROCYTE [DISTWIDTH] IN BLOOD BY AUTOMATED COUNT: 41.2 FL (ref 35.9–50)
GLUCOSE 1H P 50 G GLC PO SERPL-MCNC: 121 MG/DL (ref 70–139)
HCT VFR BLD AUTO: 33.7 % (ref 37–47)
HGB BLD-MCNC: 11.1 G/DL (ref 12–16)
MCH RBC QN AUTO: 28.5 PG (ref 27–33)
MCHC RBC AUTO-ENTMCNC: 32.9 G/DL (ref 32.2–35.5)
MCV RBC AUTO: 86.6 FL (ref 81.4–97.8)
PLATELET # BLD AUTO: 280 K/UL (ref 164–446)
PMV BLD AUTO: 11.6 FL (ref 9–12.9)
RBC # BLD AUTO: 3.89 M/UL (ref 4.2–5.4)
T PALLIDUM AB SER QL IA: NORMAL
WBC # BLD AUTO: 12.2 K/UL (ref 4.8–10.8)

## 2024-04-29 PROCEDURE — 85027 COMPLETE CBC AUTOMATED: CPT

## 2024-04-29 PROCEDURE — 36415 COLL VENOUS BLD VENIPUNCTURE: CPT

## 2024-04-29 PROCEDURE — 86780 TREPONEMA PALLIDUM: CPT

## 2024-04-29 PROCEDURE — 82950 GLUCOSE TEST: CPT

## 2024-04-30 ENCOUNTER — ANCILLARY PROCEDURE (OUTPATIENT)
Dept: MATERNAL FETAL MEDICINE | Facility: MEDICAL CENTER | Age: 24
End: 2024-04-30
Attending: OBSTETRICS & GYNECOLOGY
Payer: COMMERCIAL

## 2024-04-30 VITALS
WEIGHT: 290.6 LBS | BODY MASS INDEX: 49.88 KG/M2 | DIASTOLIC BLOOD PRESSURE: 84 MMHG | HEART RATE: 90 BPM | SYSTOLIC BLOOD PRESSURE: 138 MMHG

## 2024-04-30 DIAGNOSIS — G43.809 OTHER MIGRAINE, NOT INTRACTABLE, WITHOUT STATUS MIGRAINOSUS: ICD-10-CM

## 2024-04-30 DIAGNOSIS — O99.210 OBESITY IN PREGNANCY: ICD-10-CM

## 2024-04-30 DIAGNOSIS — M25.20 JOINT LAXITY: ICD-10-CM

## 2024-04-30 DIAGNOSIS — G43.809 OTHER MIGRAINE WITHOUT STATUS MIGRAINOSUS, NOT INTRACTABLE: ICD-10-CM

## 2024-04-30 DIAGNOSIS — O30.049 DICHORIONIC DIAMNIOTIC TWIN PREGNANCY, ANTEPARTUM: ICD-10-CM

## 2024-05-06 ENCOUNTER — ROUTINE PRENATAL (OUTPATIENT)
Dept: OBGYN | Facility: CLINIC | Age: 24
End: 2024-05-06
Payer: COMMERCIAL

## 2024-05-06 VITALS — DIASTOLIC BLOOD PRESSURE: 88 MMHG | BODY MASS INDEX: 49.28 KG/M2 | WEIGHT: 287.1 LBS | SYSTOLIC BLOOD PRESSURE: 137 MMHG

## 2024-05-06 DIAGNOSIS — O30.043 DICHORIONIC DIAMNIOTIC TWIN PREGNANCY IN THIRD TRIMESTER: ICD-10-CM

## 2024-05-06 PROCEDURE — 59025 FETAL NON-STRESS TEST: CPT | Performed by: OBSTETRICS & GYNECOLOGY

## 2024-05-06 PROCEDURE — 0502F SUBSEQUENT PRENATAL CARE: CPT | Performed by: OBSTETRICS & GYNECOLOGY

## 2024-05-06 NOTE — PROGRESS NOTES
Gertrudis Nicole Mata, a  at 33w4d with an MEGA of 2024, by Last Menstrual Period, was seen at [facility] for a nonstress test.                  NST:   A: Baseline 120 with moderate variability, accelerations present, no decelerations noted.  Reactive NST.  Indication obesity affecting pregnancy and Di Di twin gestation.  B: Baseline 120 with moderate variability, accelerations present, no decelerations noted.  Reactive NST.  Indication same as above.

## 2024-05-13 ENCOUNTER — OFFICE VISIT (OUTPATIENT)
Dept: OBGYN | Facility: CLINIC | Age: 24
End: 2024-05-13
Payer: COMMERCIAL

## 2024-05-13 VITALS — WEIGHT: 293 LBS | SYSTOLIC BLOOD PRESSURE: 129 MMHG | DIASTOLIC BLOOD PRESSURE: 65 MMHG | BODY MASS INDEX: 50.98 KG/M2

## 2024-05-13 DIAGNOSIS — O30.043 DICHORIONIC DIAMNIOTIC TWIN PREGNANCY IN THIRD TRIMESTER: ICD-10-CM

## 2024-05-13 PROCEDURE — 59025 FETAL NON-STRESS TEST: CPT | Performed by: OBSTETRICS & GYNECOLOGY

## 2024-05-13 NOTE — PROCEDURES
NST: A Baseline 130 with moderate variability, accelerations present, no decelerations noted.  Reactive NST.  Indication Di Di twin gestation.  B baseline 130 with moderate very below the accelerations present no decelerations noted.  Reactive NST.  Indication Di Di twin gestation.

## 2024-05-20 ENCOUNTER — OFFICE VISIT (OUTPATIENT)
Dept: OBGYN | Facility: CLINIC | Age: 24
End: 2024-05-20
Payer: COMMERCIAL

## 2024-05-20 VITALS — DIASTOLIC BLOOD PRESSURE: 72 MMHG | SYSTOLIC BLOOD PRESSURE: 139 MMHG | WEIGHT: 293 LBS | BODY MASS INDEX: 51.67 KG/M2

## 2024-05-20 DIAGNOSIS — O30.043 DICHORIONIC DIAMNIOTIC TWIN PREGNANCY IN THIRD TRIMESTER: ICD-10-CM

## 2024-05-20 PROCEDURE — 3078F DIAST BP <80 MM HG: CPT | Performed by: OBSTETRICS & GYNECOLOGY

## 2024-05-20 PROCEDURE — 59025 FETAL NON-STRESS TEST: CPT | Performed by: OBSTETRICS & GYNECOLOGY

## 2024-05-20 PROCEDURE — 3075F SYST BP GE 130 - 139MM HG: CPT | Performed by: OBSTETRICS & GYNECOLOGY

## 2024-05-20 NOTE — PROGRESS NOTES
NST indications-Di Di twins  Accelerations-present  Decelerations-absent  Baseline 120-130  NST interpretation; reactive nonstress test

## 2024-05-25 ENCOUNTER — ANESTHESIA (OUTPATIENT)
Dept: OBGYN | Facility: MEDICAL CENTER | Age: 24
End: 2024-05-25
Payer: COMMERCIAL

## 2024-05-25 ENCOUNTER — ANESTHESIA EVENT (OUTPATIENT)
Dept: OBGYN | Facility: MEDICAL CENTER | Age: 24
End: 2024-05-25
Payer: COMMERCIAL

## 2024-05-25 ENCOUNTER — HOSPITAL ENCOUNTER (INPATIENT)
Facility: MEDICAL CENTER | Age: 24
LOS: 2 days | End: 2024-05-27
Attending: OBSTETRICS & GYNECOLOGY | Admitting: OBSTETRICS & GYNECOLOGY
Payer: COMMERCIAL

## 2024-05-25 DIAGNOSIS — G89.18 ACUTE POSTOPERATIVE PAIN: ICD-10-CM

## 2024-05-25 DIAGNOSIS — I10 HYPERTENSION, UNSPECIFIED TYPE: ICD-10-CM

## 2024-05-25 LAB
ALBUMIN SERPL BCP-MCNC: 3.1 G/DL (ref 3.2–4.9)
ALBUMIN/GLOB SERPL: 1.1 G/DL
ALP SERPL-CCNC: 344 U/L (ref 30–99)
ALT SERPL-CCNC: 13 U/L (ref 2–50)
ANION GAP SERPL CALC-SCNC: 12 MMOL/L (ref 7–16)
AST SERPL-CCNC: 17 U/L (ref 12–45)
BASOPHILS # BLD AUTO: 0.5 % (ref 0–1.8)
BASOPHILS # BLD: 0.06 K/UL (ref 0–0.12)
BILIRUB SERPL-MCNC: <0.2 MG/DL (ref 0.1–1.5)
BUN SERPL-MCNC: 14 MG/DL (ref 8–22)
CALCIUM ALBUM COR SERPL-MCNC: 9.3 MG/DL (ref 8.5–10.5)
CALCIUM SERPL-MCNC: 8.6 MG/DL (ref 8.5–10.5)
CHLORIDE SERPL-SCNC: 108 MMOL/L (ref 96–112)
CO2 SERPL-SCNC: 16 MMOL/L (ref 20–33)
CREAT SERPL-MCNC: 0.55 MG/DL (ref 0.5–1.4)
CRYSTALS AMN MICRO: NORMAL
EOSINOPHIL # BLD AUTO: 0.19 K/UL (ref 0–0.51)
EOSINOPHIL NFR BLD: 1.6 % (ref 0–6.9)
ERYTHROCYTE [DISTWIDTH] IN BLOOD BY AUTOMATED COUNT: 41.6 FL (ref 35.9–50)
GFR SERPLBLD CREATININE-BSD FMLA CKD-EPI: 133 ML/MIN/1.73 M 2
GLOBULIN SER CALC-MCNC: 2.7 G/DL (ref 1.9–3.5)
GLUCOSE SERPL-MCNC: 76 MG/DL (ref 65–99)
HCT VFR BLD AUTO: 31.4 % (ref 37–47)
HGB BLD-MCNC: 10.8 G/DL (ref 12–16)
HOLDING TUBE BB 8507: NORMAL
IMM GRANULOCYTES # BLD AUTO: 0.07 K/UL (ref 0–0.11)
IMM GRANULOCYTES NFR BLD AUTO: 0.6 % (ref 0–0.9)
LYMPHOCYTES # BLD AUTO: 3.18 K/UL (ref 1–4.8)
LYMPHOCYTES NFR BLD: 26.6 % (ref 22–41)
MCH RBC QN AUTO: 28.5 PG (ref 27–33)
MCHC RBC AUTO-ENTMCNC: 34.4 G/DL (ref 32.2–35.5)
MCV RBC AUTO: 82.8 FL (ref 81.4–97.8)
MONOCYTES # BLD AUTO: 0.93 K/UL (ref 0–0.85)
MONOCYTES NFR BLD AUTO: 7.8 % (ref 0–13.4)
NEUTROPHILS # BLD AUTO: 7.52 K/UL (ref 1.82–7.42)
NEUTROPHILS NFR BLD: 62.9 % (ref 44–72)
NRBC # BLD AUTO: 0 K/UL
NRBC BLD-RTO: 0 /100 WBC (ref 0–0.2)
PLATELET # BLD AUTO: 234 K/UL (ref 164–446)
PMV BLD AUTO: 11.7 FL (ref 9–12.9)
POTASSIUM SERPL-SCNC: 4.4 MMOL/L (ref 3.6–5.5)
PROT SERPL-MCNC: 5.8 G/DL (ref 6–8.2)
RBC # BLD AUTO: 3.79 M/UL (ref 4.2–5.4)
SODIUM SERPL-SCNC: 136 MMOL/L (ref 135–145)
T PALLIDUM AB SER QL IA: NORMAL
WBC # BLD AUTO: 12 K/UL (ref 4.8–10.8)

## 2024-05-25 PROCEDURE — 160047 HCHG PACU  - EA ADDL 30 MINS PHASE II: Performed by: OBSTETRICS & GYNECOLOGY

## 2024-05-25 PROCEDURE — 85025 COMPLETE CBC W/AUTO DIFF WBC: CPT

## 2024-05-25 PROCEDURE — 160029 HCHG SURGERY MINUTES - 1ST 30 MINS LEVEL 4: Performed by: OBSTETRICS & GYNECOLOGY

## 2024-05-25 PROCEDURE — 80053 COMPREHEN METABOLIC PANEL: CPT

## 2024-05-25 PROCEDURE — 160041 HCHG SURGERY MINUTES - EA ADDL 1 MIN LEVEL 4: Performed by: OBSTETRICS & GYNECOLOGY

## 2024-05-25 PROCEDURE — 160035 HCHG PACU - 1ST 60 MINS PHASE I: Performed by: OBSTETRICS & GYNECOLOGY

## 2024-05-25 PROCEDURE — 85027 COMPLETE CBC AUTOMATED: CPT

## 2024-05-25 PROCEDURE — 89060 EXAM SYNOVIAL FLUID CRYSTALS: CPT

## 2024-05-25 PROCEDURE — 160009 HCHG ANES TIME/MIN: Performed by: OBSTETRICS & GYNECOLOGY

## 2024-05-25 PROCEDURE — 770002 HCHG ROOM/CARE - OB PRIVATE (112)

## 2024-05-25 PROCEDURE — 160025 RECOVERY II MINUTES (STATS): Performed by: OBSTETRICS & GYNECOLOGY

## 2024-05-25 PROCEDURE — 160048 HCHG OR STATISTICAL LEVEL 1-5: Performed by: OBSTETRICS & GYNECOLOGY

## 2024-05-25 PROCEDURE — A9270 NON-COVERED ITEM OR SERVICE: HCPCS | Performed by: ANESTHESIOLOGY

## 2024-05-25 PROCEDURE — 700111 HCHG RX REV CODE 636 W/ 250 OVERRIDE (IP): Performed by: OBSTETRICS & GYNECOLOGY

## 2024-05-25 PROCEDURE — 36415 COLL VENOUS BLD VENIPUNCTURE: CPT

## 2024-05-25 PROCEDURE — 160046 HCHG PACU - 1ST 60 MINS PHASE II: Performed by: OBSTETRICS & GYNECOLOGY

## 2024-05-25 PROCEDURE — 700111 HCHG RX REV CODE 636 W/ 250 OVERRIDE (IP): Mod: JZ

## 2024-05-25 PROCEDURE — 700102 HCHG RX REV CODE 250 W/ 637 OVERRIDE(OP): Performed by: ANESTHESIOLOGY

## 2024-05-25 PROCEDURE — C1755 CATHETER, INTRASPINAL: HCPCS | Performed by: OBSTETRICS & GYNECOLOGY

## 2024-05-25 PROCEDURE — 160002 HCHG RECOVERY MINUTES (STAT): Performed by: OBSTETRICS & GYNECOLOGY

## 2024-05-25 PROCEDURE — 86780 TREPONEMA PALLIDUM: CPT

## 2024-05-25 PROCEDURE — 700111 HCHG RX REV CODE 636 W/ 250 OVERRIDE (IP): Performed by: ANESTHESIOLOGY

## 2024-05-25 PROCEDURE — 700105 HCHG RX REV CODE 258: Performed by: OBSTETRICS & GYNECOLOGY

## 2024-05-25 PROCEDURE — 302449 STATCHG TRIAGE ONLY (STATISTIC)

## 2024-05-25 PROCEDURE — 700105 HCHG RX REV CODE 258

## 2024-05-25 PROCEDURE — 59515 CESAREAN DELIVERY: CPT | Performed by: OBSTETRICS & GYNECOLOGY

## 2024-05-25 RX ORDER — KETOROLAC TROMETHAMINE 15 MG/ML
INJECTION, SOLUTION INTRAMUSCULAR; INTRAVENOUS PRN
Status: DISCONTINUED | OUTPATIENT
Start: 2024-05-25 | End: 2024-05-25 | Stop reason: SURG

## 2024-05-25 RX ORDER — METOCLOPRAMIDE HYDROCHLORIDE 5 MG/ML
10 INJECTION INTRAMUSCULAR; INTRAVENOUS ONCE
Status: COMPLETED | OUTPATIENT
Start: 2024-05-25 | End: 2024-05-25

## 2024-05-25 RX ORDER — ONDANSETRON 2 MG/ML
4 INJECTION INTRAMUSCULAR; INTRAVENOUS EVERY 6 HOURS PRN
Status: DISCONTINUED | OUTPATIENT
Start: 2024-05-25 | End: 2024-05-25 | Stop reason: HOSPADM

## 2024-05-25 RX ORDER — CARBOPROST TROMETHAMINE 250 UG/ML
250 INJECTION, SOLUTION INTRAMUSCULAR
Status: DISCONTINUED | OUTPATIENT
Start: 2024-05-25 | End: 2024-05-25 | Stop reason: HOSPADM

## 2024-05-25 RX ORDER — MORPHINE SULFATE 0.5 MG/ML
INJECTION, SOLUTION EPIDURAL; INTRATHECAL; INTRAVENOUS
Status: COMPLETED | OUTPATIENT
Start: 2024-05-25 | End: 2024-05-25

## 2024-05-25 RX ORDER — OXYCODONE HYDROCHLORIDE 5 MG/1
5 TABLET ORAL EVERY 4 HOURS PRN
Status: DISCONTINUED | OUTPATIENT
Start: 2024-05-26 | End: 2024-05-27 | Stop reason: HOSPADM

## 2024-05-25 RX ORDER — EPHEDRINE SULFATE 50 MG/ML
5 INJECTION, SOLUTION INTRAVENOUS
Status: DISCONTINUED | OUTPATIENT
Start: 2024-05-25 | End: 2024-05-25 | Stop reason: HOSPADM

## 2024-05-25 RX ORDER — ONDANSETRON 4 MG/1
4 TABLET, ORALLY DISINTEGRATING ORAL EVERY 6 HOURS PRN
Status: DISCONTINUED | OUTPATIENT
Start: 2024-05-25 | End: 2024-05-25 | Stop reason: HOSPADM

## 2024-05-25 RX ORDER — SIMETHICONE 125 MG
125 TABLET,CHEWABLE ORAL 4 TIMES DAILY PRN
Status: DISCONTINUED | OUTPATIENT
Start: 2024-05-25 | End: 2024-05-27 | Stop reason: HOSPADM

## 2024-05-25 RX ORDER — OXYCODONE HYDROCHLORIDE 10 MG/1
10 TABLET ORAL EVERY 4 HOURS PRN
Status: DISCONTINUED | OUTPATIENT
Start: 2024-05-26 | End: 2024-05-27 | Stop reason: HOSPADM

## 2024-05-25 RX ORDER — LIDOCAINE HYDROCHLORIDE 10 MG/ML
20 INJECTION, SOLUTION INFILTRATION; PERINEURAL
Status: DISCONTINUED | OUTPATIENT
Start: 2024-05-25 | End: 2024-05-25 | Stop reason: HOSPADM

## 2024-05-25 RX ORDER — ALUMINA, MAGNESIA, AND SIMETHICONE 2400; 2400; 240 MG/30ML; MG/30ML; MG/30ML
30 SUSPENSION ORAL EVERY 6 HOURS PRN
Status: DISCONTINUED | OUTPATIENT
Start: 2024-05-25 | End: 2024-05-25 | Stop reason: HOSPADM

## 2024-05-25 RX ORDER — DIPHENHYDRAMINE HYDROCHLORIDE 50 MG/ML
12.5 INJECTION INTRAMUSCULAR; INTRAVENOUS EVERY 6 HOURS PRN
Status: ACTIVE | OUTPATIENT
Start: 2024-05-25 | End: 2024-05-26

## 2024-05-25 RX ORDER — ONDANSETRON 2 MG/ML
4 INJECTION INTRAMUSCULAR; INTRAVENOUS EVERY 6 HOURS PRN
Status: ACTIVE | OUTPATIENT
Start: 2024-05-25 | End: 2024-05-26

## 2024-05-25 RX ORDER — HYDROMORPHONE HYDROCHLORIDE 1 MG/ML
0.4 INJECTION, SOLUTION INTRAMUSCULAR; INTRAVENOUS; SUBCUTANEOUS
Status: DISCONTINUED | OUTPATIENT
Start: 2024-05-25 | End: 2024-05-25 | Stop reason: HOSPADM

## 2024-05-25 RX ORDER — OXYCODONE HYDROCHLORIDE 5 MG/1
5 TABLET ORAL EVERY 4 HOURS PRN
Status: ACTIVE | OUTPATIENT
Start: 2024-05-25 | End: 2024-05-26

## 2024-05-25 RX ORDER — KETOROLAC TROMETHAMINE 15 MG/ML
15 INJECTION, SOLUTION INTRAMUSCULAR; INTRAVENOUS EVERY 6 HOURS
Status: COMPLETED | OUTPATIENT
Start: 2024-05-25 | End: 2024-05-26

## 2024-05-25 RX ORDER — OXYTOCIN 10 [USP'U]/ML
10 INJECTION, SOLUTION INTRAMUSCULAR; INTRAVENOUS
Status: DISCONTINUED | OUTPATIENT
Start: 2024-05-25 | End: 2024-05-25 | Stop reason: HOSPADM

## 2024-05-25 RX ORDER — HYDROMORPHONE HYDROCHLORIDE 1 MG/ML
0.1 INJECTION, SOLUTION INTRAMUSCULAR; INTRAVENOUS; SUBCUTANEOUS
Status: DISCONTINUED | OUTPATIENT
Start: 2024-05-25 | End: 2024-05-25 | Stop reason: HOSPADM

## 2024-05-25 RX ORDER — SODIUM CHLORIDE, SODIUM LACTATE, POTASSIUM CHLORIDE, CALCIUM CHLORIDE 600; 310; 30; 20 MG/100ML; MG/100ML; MG/100ML; MG/100ML
INJECTION, SOLUTION INTRAVENOUS PRN
Status: DISCONTINUED | OUTPATIENT
Start: 2024-05-25 | End: 2024-05-27 | Stop reason: HOSPADM

## 2024-05-25 RX ORDER — SODIUM CHLORIDE, SODIUM GLUCONATE, SODIUM ACETATE, POTASSIUM CHLORIDE AND MAGNESIUM CHLORIDE 526; 502; 368; 37; 30 MG/100ML; MG/100ML; MG/100ML; MG/100ML; MG/100ML
1500 INJECTION, SOLUTION INTRAVENOUS ONCE
Status: COMPLETED | OUTPATIENT
Start: 2024-05-25 | End: 2024-05-25

## 2024-05-25 RX ORDER — ACETAMINOPHEN 500 MG
1000 TABLET ORAL EVERY 6 HOURS
Qty: 24 TABLET | Refills: 0 | Status: DISCONTINUED | OUTPATIENT
Start: 2024-05-26 | End: 2024-05-27 | Stop reason: HOSPADM

## 2024-05-25 RX ORDER — DIPHENHYDRAMINE HYDROCHLORIDE 50 MG/ML
25 INJECTION INTRAMUSCULAR; INTRAVENOUS EVERY 6 HOURS PRN
Status: DISCONTINUED | OUTPATIENT
Start: 2024-05-26 | End: 2024-05-27 | Stop reason: HOSPADM

## 2024-05-25 RX ORDER — POLYETHYLENE GLYCOL 3350 17 G/17G
1 POWDER, FOR SOLUTION ORAL
Status: DISCONTINUED | OUTPATIENT
Start: 2024-05-25 | End: 2024-05-27 | Stop reason: HOSPADM

## 2024-05-25 RX ORDER — DIPHENHYDRAMINE HCL 25 MG
25 TABLET ORAL EVERY 6 HOURS PRN
Status: DISCONTINUED | OUTPATIENT
Start: 2024-05-26 | End: 2024-05-27 | Stop reason: HOSPADM

## 2024-05-25 RX ORDER — DIPHENHYDRAMINE HYDROCHLORIDE 50 MG/ML
12.5 INJECTION INTRAMUSCULAR; INTRAVENOUS
Status: DISCONTINUED | OUTPATIENT
Start: 2024-05-25 | End: 2024-05-25 | Stop reason: HOSPADM

## 2024-05-25 RX ORDER — OXYCODONE HYDROCHLORIDE AND ACETAMINOPHEN 5; 325 MG/1; MG/1
2 TABLET ORAL
Status: COMPLETED | OUTPATIENT
Start: 2024-05-25 | End: 2024-05-25

## 2024-05-25 RX ORDER — SODIUM CHLORIDE, SODIUM GLUCONATE, SODIUM ACETATE, POTASSIUM CHLORIDE AND MAGNESIUM CHLORIDE 526; 502; 368; 37; 30 MG/100ML; MG/100ML; MG/100ML; MG/100ML; MG/100ML
500 INJECTION, SOLUTION INTRAVENOUS CONTINUOUS
Status: DISCONTINUED | OUTPATIENT
Start: 2024-05-25 | End: 2024-05-27 | Stop reason: HOSPADM

## 2024-05-25 RX ORDER — HYDROMORPHONE HYDROCHLORIDE 1 MG/ML
0.2 INJECTION, SOLUTION INTRAMUSCULAR; INTRAVENOUS; SUBCUTANEOUS
Status: DISCONTINUED | OUTPATIENT
Start: 2024-05-25 | End: 2024-05-25 | Stop reason: HOSPADM

## 2024-05-25 RX ORDER — SODIUM CHLORIDE, SODIUM LACTATE, POTASSIUM CHLORIDE, CALCIUM CHLORIDE 600; 310; 30; 20 MG/100ML; MG/100ML; MG/100ML; MG/100ML
INJECTION, SOLUTION INTRAVENOUS CONTINUOUS
Status: DISCONTINUED | OUTPATIENT
Start: 2024-05-25 | End: 2024-05-27 | Stop reason: HOSPADM

## 2024-05-25 RX ORDER — ACETAMINOPHEN 500 MG
1000 TABLET ORAL
Status: DISCONTINUED | OUTPATIENT
Start: 2024-05-25 | End: 2024-05-25 | Stop reason: HOSPADM

## 2024-05-25 RX ORDER — ACETAMINOPHEN 500 MG
1000 TABLET ORAL EVERY 6 HOURS PRN
Status: DISCONTINUED | OUTPATIENT
Start: 2024-05-29 | End: 2024-05-27 | Stop reason: HOSPADM

## 2024-05-25 RX ORDER — KETOROLAC TROMETHAMINE 15 MG/ML
15 INJECTION, SOLUTION INTRAMUSCULAR; INTRAVENOUS EVERY 6 HOURS
Status: DISCONTINUED | OUTPATIENT
Start: 2024-05-25 | End: 2024-05-25

## 2024-05-25 RX ORDER — DEXAMETHASONE SODIUM PHOSPHATE 4 MG/ML
INJECTION, SOLUTION INTRA-ARTICULAR; INTRALESIONAL; INTRAMUSCULAR; INTRAVENOUS; SOFT TISSUE PRN
Status: DISCONTINUED | OUTPATIENT
Start: 2024-05-25 | End: 2024-05-25 | Stop reason: SURG

## 2024-05-25 RX ORDER — OXYCODONE HYDROCHLORIDE AND ACETAMINOPHEN 5; 325 MG/1; MG/1
1 TABLET ORAL
Status: COMPLETED | OUTPATIENT
Start: 2024-05-25 | End: 2024-05-25

## 2024-05-25 RX ORDER — BUPIVACAINE HYDROCHLORIDE 7.5 MG/ML
INJECTION, SOLUTION INTRASPINAL
Status: COMPLETED | OUTPATIENT
Start: 2024-05-25 | End: 2024-05-25

## 2024-05-25 RX ORDER — ASPIRIN 81 MG/1
81 TABLET, CHEWABLE ORAL DAILY
Status: ON HOLD | COMMUNITY
End: 2024-05-27

## 2024-05-25 RX ORDER — MISOPROSTOL 200 UG/1
800 TABLET ORAL
Status: DISCONTINUED | OUTPATIENT
Start: 2024-05-25 | End: 2024-05-25 | Stop reason: HOSPADM

## 2024-05-25 RX ORDER — ACETAMINOPHEN 500 MG
1000 TABLET ORAL EVERY 6 HOURS
Status: COMPLETED | OUTPATIENT
Start: 2024-05-25 | End: 2024-05-26

## 2024-05-25 RX ORDER — CEFAZOLIN SODIUM 1 G/3ML
INJECTION, POWDER, FOR SOLUTION INTRAMUSCULAR; INTRAVENOUS PRN
Status: DISCONTINUED | OUTPATIENT
Start: 2024-05-25 | End: 2024-05-25 | Stop reason: SURG

## 2024-05-25 RX ORDER — CITRIC ACID/SODIUM CITRATE 334-500MG
30 SOLUTION, ORAL ORAL ONCE
Status: COMPLETED | OUTPATIENT
Start: 2024-05-25 | End: 2024-05-25

## 2024-05-25 RX ORDER — IBUPROFEN 800 MG/1
800 TABLET ORAL EVERY 8 HOURS PRN
Status: DISCONTINUED | OUTPATIENT
Start: 2024-05-29 | End: 2024-05-27 | Stop reason: HOSPADM

## 2024-05-25 RX ORDER — ONDANSETRON 2 MG/ML
INJECTION INTRAMUSCULAR; INTRAVENOUS PRN
Status: DISCONTINUED | OUTPATIENT
Start: 2024-05-25 | End: 2024-05-25 | Stop reason: SURG

## 2024-05-25 RX ORDER — TERBUTALINE SULFATE 1 MG/ML
0.25 INJECTION, SOLUTION SUBCUTANEOUS
Status: DISCONTINUED | OUTPATIENT
Start: 2024-05-25 | End: 2024-05-25 | Stop reason: HOSPADM

## 2024-05-25 RX ORDER — DIPHENHYDRAMINE HYDROCHLORIDE 50 MG/ML
25 INJECTION INTRAMUSCULAR; INTRAVENOUS EVERY 6 HOURS PRN
Status: DISPENSED | OUTPATIENT
Start: 2024-05-25 | End: 2024-05-26

## 2024-05-25 RX ORDER — HYDROMORPHONE HYDROCHLORIDE 1 MG/ML
0.2 INJECTION, SOLUTION INTRAMUSCULAR; INTRAVENOUS; SUBCUTANEOUS
Status: ACTIVE | OUTPATIENT
Start: 2024-05-25 | End: 2024-05-26

## 2024-05-25 RX ORDER — OXYCODONE HYDROCHLORIDE 10 MG/1
10 TABLET ORAL EVERY 4 HOURS PRN
Status: ACTIVE | OUTPATIENT
Start: 2024-05-25 | End: 2024-05-26

## 2024-05-25 RX ORDER — HYDROMORPHONE HYDROCHLORIDE 1 MG/ML
0.4 INJECTION, SOLUTION INTRAMUSCULAR; INTRAVENOUS; SUBCUTANEOUS
Status: ACTIVE | OUTPATIENT
Start: 2024-05-25 | End: 2024-05-26

## 2024-05-25 RX ORDER — IBUPROFEN 800 MG/1
800 TABLET ORAL EVERY 8 HOURS
Qty: 9 TABLET | Refills: 0 | Status: DISCONTINUED | OUTPATIENT
Start: 2024-05-26 | End: 2024-05-27 | Stop reason: HOSPADM

## 2024-05-25 RX ORDER — ENOXAPARIN SODIUM 100 MG/ML
40 INJECTION SUBCUTANEOUS DAILY
Status: DISCONTINUED | OUTPATIENT
Start: 2024-05-25 | End: 2024-05-27 | Stop reason: HOSPADM

## 2024-05-25 RX ORDER — EPHEDRINE SULFATE 50 MG/ML
10 INJECTION, SOLUTION INTRAVENOUS
Status: ACTIVE | OUTPATIENT
Start: 2024-05-25 | End: 2024-05-26

## 2024-05-25 RX ORDER — ONDANSETRON 2 MG/ML
4 INJECTION INTRAMUSCULAR; INTRAVENOUS
Status: DISCONTINUED | OUTPATIENT
Start: 2024-05-25 | End: 2024-05-25 | Stop reason: HOSPADM

## 2024-05-25 RX ORDER — ONDANSETRON 4 MG/1
4 TABLET, ORALLY DISINTEGRATING ORAL EVERY 6 HOURS PRN
Status: DISCONTINUED | OUTPATIENT
Start: 2024-05-26 | End: 2024-05-27 | Stop reason: HOSPADM

## 2024-05-25 RX ORDER — IBUPROFEN 800 MG/1
800 TABLET ORAL
Status: DISCONTINUED | OUTPATIENT
Start: 2024-05-25 | End: 2024-05-25 | Stop reason: HOSPADM

## 2024-05-25 RX ORDER — HALOPERIDOL 5 MG/ML
1 INJECTION INTRAMUSCULAR
Status: DISCONTINUED | OUTPATIENT
Start: 2024-05-25 | End: 2024-05-25 | Stop reason: HOSPADM

## 2024-05-25 RX ORDER — ONDANSETRON 2 MG/ML
4 INJECTION INTRAMUSCULAR; INTRAVENOUS EVERY 6 HOURS PRN
Status: DISCONTINUED | OUTPATIENT
Start: 2024-05-26 | End: 2024-05-27 | Stop reason: HOSPADM

## 2024-05-25 RX ADMIN — SODIUM CHLORIDE, SODIUM GLUCONATE, SODIUM ACETATE, POTASSIUM CHLORIDE AND MAGNESIUM CHLORIDE: 526; 502; 368; 37; 30 INJECTION, SOLUTION INTRAVENOUS at 09:34

## 2024-05-25 RX ADMIN — DEXAMETHASONE SODIUM PHOSPHATE 8 MG: 4 INJECTION INTRA-ARTICULAR; INTRALESIONAL; INTRAMUSCULAR; INTRAVENOUS; SOFT TISSUE at 09:48

## 2024-05-25 RX ADMIN — FAMOTIDINE 20 MG: 10 INJECTION, SOLUTION INTRAVENOUS at 08:59

## 2024-05-25 RX ADMIN — CEFAZOLIN 3 G: 1 INJECTION, POWDER, FOR SOLUTION INTRAMUSCULAR; INTRAVENOUS at 09:35

## 2024-05-25 RX ADMIN — OXYTOCIN 125 ML/HR: 10 INJECTION INTRAVENOUS at 12:13

## 2024-05-25 RX ADMIN — OXYTOCIN 500 ML: 10 INJECTION, SOLUTION INTRAMUSCULAR; INTRAVENOUS at 10:08

## 2024-05-25 RX ADMIN — METOCLOPRAMIDE 10 MG: 5 INJECTION, SOLUTION INTRAMUSCULAR; INTRAVENOUS at 08:59

## 2024-05-25 RX ADMIN — PHENYLEPHRINE HYDROCHLORIDE 0.5 MCG/KG/MIN: 10 INJECTION INTRAVENOUS at 09:48

## 2024-05-25 RX ADMIN — KETOROLAC TROMETHAMINE 15 MG: 15 INJECTION, SOLUTION INTRAMUSCULAR; INTRAVENOUS at 10:08

## 2024-05-25 RX ADMIN — KETOROLAC TROMETHAMINE 15 MG: 15 INJECTION, SOLUTION INTRAMUSCULAR; INTRAVENOUS at 16:02

## 2024-05-25 RX ADMIN — SODIUM CHLORIDE, SODIUM GLUCONATE, SODIUM ACETATE, POTASSIUM CHLORIDE AND MAGNESIUM CHLORIDE: 526; 502; 368; 37; 30 INJECTION, SOLUTION INTRAVENOUS at 10:37

## 2024-05-25 RX ADMIN — SODIUM CHLORIDE, POTASSIUM CHLORIDE, SODIUM LACTATE AND CALCIUM CHLORIDE: 600; 310; 30; 20 INJECTION, SOLUTION INTRAVENOUS at 16:34

## 2024-05-25 RX ADMIN — DIPHENHYDRAMINE HYDROCHLORIDE 25 MG: 50 INJECTION, SOLUTION INTRAMUSCULAR; INTRAVENOUS at 16:03

## 2024-05-25 RX ADMIN — KETOROLAC TROMETHAMINE 15 MG: 15 INJECTION, SOLUTION INTRAMUSCULAR; INTRAVENOUS at 22:04

## 2024-05-25 RX ADMIN — ACETAMINOPHEN 1000 MG: 500 TABLET, FILM COATED ORAL at 22:04

## 2024-05-25 RX ADMIN — ACETAMINOPHEN 1000 MG: 500 TABLET, FILM COATED ORAL at 16:02

## 2024-05-25 RX ADMIN — BUPIVACAINE HYDROCHLORIDE IN DEXTROSE 1.5 ML: 7.5 INJECTION, SOLUTION SUBARACHNOID at 09:47

## 2024-05-25 RX ADMIN — Medication 30 ML: at 09:00

## 2024-05-25 RX ADMIN — OXYCODONE AND ACETAMINOPHEN 1 TABLET: 5; 325 TABLET ORAL at 12:01

## 2024-05-25 RX ADMIN — ONDANSETRON 4 MG: 2 INJECTION INTRAMUSCULAR; INTRAVENOUS at 09:35

## 2024-05-25 RX ADMIN — MORPHINE SULFATE 150 MCG: 0.5 INJECTION, SOLUTION EPIDURAL; INTRATHECAL; INTRAVENOUS at 09:47

## 2024-05-25 RX ADMIN — ENOXAPARIN SODIUM 40 MG: 100 INJECTION SUBCUTANEOUS at 22:03

## 2024-05-25 ASSESSMENT — LIFESTYLE VARIABLES
ALCOHOL_USE: NO
AVERAGE NUMBER OF DAYS PER WEEK YOU HAVE A DRINK CONTAINING ALCOHOL: 0
HAVE PEOPLE ANNOYED YOU BY CRITICIZING YOUR DRINKING: NO
ON A TYPICAL DAY WHEN YOU DRINK ALCOHOL HOW MANY DRINKS DO YOU HAVE: 0
TOTAL SCORE: 0
EVER_SMOKED: NEVER
TOTAL SCORE: 0
EVER HAD A DRINK FIRST THING IN THE MORNING TO STEADY YOUR NERVES TO GET RID OF A HANGOVER: NO
HOW MANY TIMES IN THE PAST YEAR HAVE YOU HAD 5 OR MORE DRINKS IN A DAY: 0
TOTAL SCORE: 0
HAVE YOU EVER FELT YOU SHOULD CUT DOWN ON YOUR DRINKING: NO
EVER FELT BAD OR GUILTY ABOUT YOUR DRINKING: NO
DOES PATIENT WANT TO STOP DRINKING: NO
CONSUMPTION TOTAL: NEGATIVE

## 2024-05-25 ASSESSMENT — PAIN DESCRIPTION - PAIN TYPE
TYPE: SURGICAL PAIN
TYPE: ACUTE PAIN
TYPE: SURGICAL PAIN
TYPE: ACUTE PAIN
TYPE: ACUTE PAIN
TYPE: SURGICAL PAIN

## 2024-05-25 ASSESSMENT — PAIN SCALES - GENERAL: PAIN_LEVEL: 0

## 2024-05-25 ASSESSMENT — PATIENT HEALTH QUESTIONNAIRE - PHQ9
2. FEELING DOWN, DEPRESSED, IRRITABLE, OR HOPELESS: NOT AT ALL
1. LITTLE INTEREST OR PLEASURE IN DOING THINGS: NOT AT ALL
SUM OF ALL RESPONSES TO PHQ9 QUESTIONS 1 AND 2: 0

## 2024-05-25 NOTE — PROGRESS NOTES
0700 Report received from GLORIA Gramajo. Care assumed at this time. ROMAN Regan at bedside and Dr. Pettit at bedside for ultrasound. POC discussed with patient and questions answered at this time.     0835 Bret Castro at bedside to discuss POC. Patient elects primary  section at this time.     0934 Patient arrival via wheelchair to OR2     1006 Delivery of viable male baby A   1007 Delivery of viable male baby B    1048 Patient arrival via gurney to PACU bed. Patient educated on lochia and bleeding expectations.    1430 Patient transported via gurney in apparent stable condition with infants in arms and in possession of all belongings to postpartum department. Report given to GLORIA Salguero. Lochia and bleeding assessed, cuddles and bands verified. Transfer of care at this time.

## 2024-05-25 NOTE — ANESTHESIA PROCEDURE NOTES
Spinal Block    Date/Time: 5/25/2024 9:47 AM    Performed by: Marino Correia M.D.  Authorized by: Marino Correia M.D.    Patient Location:  OR  Start Time:  5/25/2024 9:47 AM  Reason for Block: primary anesthetic    patient identified, IV checked, site marked, risks and benefits discussed, surgical consent, monitors and equipment checked, pre-op evaluation and timeout performed    Patient Position:  Sitting  Prep: ChloraPrep, patient draped and sterile technique    Monitoring:  Blood pressure, continuous pulse oximetry and heart rate  Approach:  Midline  Location:  L3-4  Injection Technique:  Single-shot  Skin infiltration:  Lidocaine  Strength:  1%  Dose:  3ml  Needle Type:  Pencan  Needle Gauge:  25 G  CSF flowing pre/post injection:  Yes  Sensory Level:  T4   Placed with two attempts

## 2024-05-25 NOTE — ANESTHESIA TIME REPORT
Anesthesia Start and Stop Event Times       Date Time Event    5/25/2024 0907 Ready for Procedure     0934 Anesthesia Start     1053 Anesthesia Stop          Responsible Staff  05/25/24      Name Role Begin End    Marino Correia M.D. Anesth 0934 1053          Overtime Reason:  no overtime (within assigned shift)    Comments:

## 2024-05-25 NOTE — CARE PLAN
The patient is Stable - Low risk of patient condition declining or worsening    Shift Goals  Clinical Goals: VSS, lochia WDL, pain WDL  Patient Goals:   Family Goals:     Progress made toward(s) clinical / shift goals:  VSS, lochia light, pain controlled with scheduled medications, encouraged to call with pain needs      Problem: Knowledge Deficit - Postpartum  Goal: Patient will verbalize and demonstrate understanding of self and infant care  Outcome: Progressing     Problem: Psychosocial - Postpartum  Goal: Patient will verbalize and demonstrate effective bonding and parenting behavior  Outcome: Progressing

## 2024-05-25 NOTE — PROGRESS NOTES
Patient admitted to room S326 via gurney with significant other, infant, and L&D RN. Report received from GLORIA Celaya. VSS.  in use. SCDs in use. IS at bedside, patient demonstrates proper use. Denies pain at this time. Oriented to unit, room, infant sleeping policy, infant feeding policy, call light, emergency pull cord, and plan of care. SO and patient express understanding. Call light within reach, encouraged to call for assistance.

## 2024-05-25 NOTE — ANESTHESIA PREPROCEDURE EVALUATION
" Case: [id number] Date/Time: 24 0900    Procedure:  SECTION, PRIMARY (Abdomen)    Anesthesia type: Spinal    Pre-op diagnosis: 36.2 WEEKS TWINS, BABY B IN BREECH PRESENTATION    Location: [facility] OR  / SURGERY LABOR AND DELIVERY    Surgeons: Linda Queen M.D.            Past Medical History:   Diagnosis Date    Anxiety     Depression     Joint dislocation     Migraine        Past Surgical History:   Procedure Laterality Date    EYE SURGERY Left        Current Outpatient Medications   Medication Instructions    amitriptyline (ELAVIL) 25 mg, Oral, NIGHTLY    aspirin (ASA) 81 mg, Oral, DAILY    omeprazole (PRILOSEC) 40 mg, Oral, DAILY       BP (!) 171/96   Pulse 89   Temp 36.4 °C (97.5 °F) (Temporal)   Resp 18   Ht 1.626 m (5' 4\")   Wt (!) 137 kg (301 lb)   SpO2 98%     No Known Allergies    Recent Labs     23  1708 10/17/23  1627 23  1510 23  1525 24  1436 24  0710   ABOGROUP  --   --   --  A  --   --    ABSCRN  --   --   --  NEG  --   --    HEMOGLOBIN 13.4  --  12.0  --    < > 10.8*   PLATELETCT 293  --  272  --    < > 234   RUBELLAIGG  --   --  >500  --   --   --    HEPBSAG  --   --  Non-Reactive  --   --   --    HEPCAB  --   --  Non-Reactive  --   --   --    TSHULTRASEN 6.570*   < > 2.370  --   --   --    FREET4 1.16  --   --   --   --   --     < > = values in this interval not displayed.         Relevant Problems   NEURO   (positive) Migraine without status migrainosus, not intractable      CARDIAC   (positive) Migraine without status migrainosus, not intractable       Physical Exam    Airway   Mallampati: II  TM distance: >3 FB  Neck ROM: full       Cardiovascular - normal exam  Rhythm: regular  Rate: normal  (-) murmur     Dental - normal exam           Pulmonary - normal exam  Breath sounds clear to auscultation     Abdominal   (+) obese     Neurological - normal exam                   Anesthesia Plan    ASA 3- EMERGENT   ASA physical status 3 criteria: " hypertension - poorly controlled and morbid obesity - BMI greater than or equal to 40ASA physical status emergent criteria: non-reassuring fetal heart tones    Plan - spinal   Neuraxial block will be primary anesthetic                Postoperative Plan: Postoperative administration of opioids is intended.    Pertinent diagnostic labs and testing reviewed    Informed Consent:    Anesthetic plan and risks discussed with patient.    Use of blood products discussed with: patient whom consented to blood products.       Spinal anesthetic procedure and risks discussed with patient in detail. Risks include, but are not limited to, failed spinal with need for general anesthetic, pain, anxiety, itching, allergic reaction, nausea, infection, bleeding, and/or nerve damage. All questions answered. Patient indicates full understanding of procedure and risks and agrees to continue as planned.

## 2024-05-25 NOTE — H&P
History and Physical    Gertrudis Mata is a 23 y.o. female  -Para:     Gestational Age:  36w2d  Admitted for:   Active Labor  Admitted to  [facility] Delivery.  Patient received prenatal care: [facility]    HPI: Patient is admitted with the above mentioned Chief Complaint and States   Loss of fluid:   Positive with bloody fluid at 0530  Abdominal Pain:  negative  Uterine Contractions:  positive  Vaginal Bleeding:  positive  Fetal Movement:  Normal x 2  Patient denies fever, chills, nausea, vomiting , headache, visual disturbance, or dysuria  Patient's last menstrual period was 2023.  Estimated Date of Delivery: 24  Final MEGA: 2024, by Last Menstrual Period    Patient Active Problem List    Diagnosis Date Noted    Labor and delivery, indication for care 2024    Migraine without status migrainosus, not intractable 2023    Obesity in pregnancy 2023    Joint laxity 2023    Dichorionic diamniotic twin gestation 2023       Admitting DX: Labor and delivery, indication for care [O75.9]   Pregnancy Complications:  none  OB Risk Factors:   Di-di twin gestation (vtx/breech), PIH, excessive weight gain, obesity  Labor State:    SROM and Early latent labor.    History:   has a past medical history of Anxiety, Depression, Joint dislocation, and Migraine.     has a past surgical history that includes eye surgery (Left, ).    OB History    Para Term  AB Living   1 0 0 0 0 0   SAB IAB Ectopic Molar Multiple Live Births   0 0 0 0 0 0      # Outcome Date GA Lbr [place]/2nd Weight Sex Delivery Anes PTL Lv   1 Current                Medications:  No current facility-administered medications on file prior to encounter.     Current Outpatient Medications on File Prior to Encounter   Medication Sig Dispense Refill    aspirin (ASA) 81 MG Chew Tab chewable tablet Chew 81 mg every day.      omeprazole (PRILOSEC) 40 MG delayed-release  "capsule Take 40 mg by mouth every day.      amitriptyline (ELAVIL) 25 MG Tab Take 25 mg by mouth every evening.         Allergies:  Patient has no known allergies.    ROS:   Neuro: negative    Cardiovascular: negative  Gastro intestinal: negative  Genitourinary: negative            Physical Exam:  BP (!) 171/96   Pulse 89   Temp 36.4 °C (97.5 °F) (Temporal)   Resp 18   Ht 1.626 m (5' 4\")   Wt (!) 137 kg (301 lb)   LMP 09/14/2023   SpO2 98%   BMI 51.67 kg/m²   Constitutional: healthy-appearing, Well-developed, well-nourished, moderately obese, in no acute distress  No JVD: while supine  HEENT: EOMI  Breast Exam: Inspection negative. No nipple discharge or bleeding. No masses or nodularity palpable  Cardio: regular rate and rhythm  Lung: unlabored respirations, no intercostal retractions or accessory muscle use, clear to auscultation without rales or wheezes  Abdomen: abdomen is soft without significant tenderness, masses, organomegaly or guarding  Extremity: extremities, peripheral pulses and reflexes normal, no edema, redness or tenderness in the calves or thighs, Homans sign is negative, no sign of DVT, feet normal, good pulses, normal color, temperature and sensation    Cervical Exam: 100%  Cervix Dilatation: 2  Station: negative 3  Pelvis: Adequate  Fetal Assessment: Fetal heart variability: moderate x 2  Fetal Heart Rate decelerations: none x 2  Fetal Heart Rate accelerations: yes x2  Baseline FHR: 135/150 per minute  Uterine contractions: regular, every 2-3 minutes  Estimated Fetal Weight: 2000 - 2500g for both babies with a 6.2% discordance. Baby A was 48% and B was 30% on 4/30/2024      Labs:  Recent Labs     05/25/24  0710   WBC 12.0*   RBC 3.79*   HEMOGLOBIN 10.8*   HEMATOCRIT 31.4*   MCV 82.8   MCH 28.5   MCHC 34.4   RDW 41.6   PLATELETCT 234   MPV 11.7     Prenatal Results       General (Most Recent Result)       Test Value Date Time    ABO  A  12/19/23 1525    Rh  POS  12/19/23 1525    Antibody " screen  NEG  12/19/23 1525    HbA1c       Chlamydia by PCR       Gonorrhea by PCR       RPR/Syphilus  Non-Reactive  04/29/24 1436    HSV 1/2 by PCR (non-serum)       HSV 1/2 (serum)       HSV 1       HSV 2       HPV (16)       HBsAg  Non-Reactive  12/19/23 1510    HIV-1 HIV-2 Antibodies  Non-Reactive  12/19/23 1510    Rubella  >500 IU/mL 12/19/23 1510    Tb                 Pap Smear (Most Recent Result)       Test Value Date Time    Pap smear       Pap smear w/HPV       Pap smear w/CTNG  (See Report)   12/13/23 1506    Pap smar w/HPV CTNG       Pap smear (reflex HPV ACUS)       Pap smear (reflex HPV ASCUS w/CTNG)       Pathology gyn specimen                 Urinalysis (Most Recent Result)       Test Value Date Time    Urinalysis       POC urinalysis       Urine drug screen (w/ conf)  (See Report)   12/19/23 1509    Urine culture ([id number])  (See Report)   12/19/23 1510    Urine Protein/Creatinine Ratio                 Urinalysis, Culture if indicated       Test Value Date Time    Color       Appearance       Specific Gravity       PH       Glucose       Ketones       Protein       Bilirubin       Nitrites       Leukocytes Esterase       Blood       Comment       Culture                 Urine Drug Screen       Test Value Date Time    Amphetamines       Barbiturates  Negative ng/mL 12/19/23 1509    Benzodiazepines  Negative ng/mL 12/19/23 1509    Cocaine  Negative ng/mL 12/19/23 1509    Methadone  Negative ng/mL 12/19/23 1509    Opiates       Oxycodone       Phencylidine  Negative ng/mL 12/19/23 1509    Propoxyphene       Marijuana Metabolite  Negative ng/mL 12/19/23 1509              1st Trimester       Test Value Date Time    Hgb  12.0 g/dL 12/19/23 1510    Hct  36.0 % 12/19/23 1510    Fasting Glucose Tolerance       GTT, 1 hour  105 mg/dL 12/19/23 1510    GTT, 2 hours       GTT, 3 hours                 2nd Trimester       Test Value Date Time    Hgb       Hct       AST       ALT       Uric Acid       Fasting  Glucose Tolerance       GTT, 1 hour       GTT, 2 hours       GTT, 3 hours                 3rd Trimester       Test Value Date Time    Hgb  10.8 g/dL 05/25/24 0710       11.1 g/dL 04/29/24 1436    Hct  31.4 % 05/25/24 0710       33.7 % 04/29/24 1436    Platelet count  234 K/uL 05/25/24 0710       280 K/uL 04/29/24 1436    GBS (MONCADA BROTH)       Fasting Glucose Tolerance       GTT, 1 hour  121 mg/dL 04/29/24 1540    GTT, 2 hous       GTT, 3hours                 Congenital Disease Screening       Test Value Date Time    First Trimester Screen       Quad Screen       BH Electrophoresis       Cystic Fibrosis Carrier Study       SMA       AFP Maternal Serum        AFP Tetra       NIPT                 Legend    ^: Historical                              Assessment:  Gestational Age:  36w2d  Labor State:   Labor, Active  Risk Factors:   Di-Di twins, PIH, excessive weight gain, obesity  Pregnancy Complications: None    Patient Active Problem List    Diagnosis Date Noted    Labor and delivery, indication for care 05/25/2024    Migraine without status migrainosus, not intractable 12/13/2023    Obesity in pregnancy 12/13/2023    Joint laxity 12/13/2023    Dichorionic diamniotic twin gestation 11/16/2023       Plan:   Admitted for: Active Labor  Delivery TBD- options reviewed by Dr Pettit  CBC, CMP, UA, and RPR  routine urinalysis  Antibiotics: Prophylaxis for GBS    Sienna Woodward C.N.M.    Dr Ribeiro are the attending MD today    Oncoming MD to  patient on options and decide delivery plan

## 2024-05-25 NOTE — CARE PLAN
The patient is Watcher - Medium risk of patient condition declining or worsening    Shift Goals  Clinical Goals: Patient safety  Patient Goals: Healthy mom, Healthy babies  Family Goals: Support    Progress made toward(s) clinical / shift goals:    Problem: Knowledge Deficit -   Goal: Patient/support person will demonstrate understanding regarding  section  Description: Target End Date:  1-3 days or as soon as patient condition allows    1.  Describe operative procedures in advance and provide rationale as appropriate  2.  Discuss anticipated sensations during operation and recovery period  3.  Provide pre-op education to patient/significant other/support person  4.  Instruct patient in incentive spirometry use  Outcome: Progressing  Note: Patient encouraged to ask RN any questions relating to the POC that the patient may require further understanding.      Problem: Pain  Goal: Patient's pain will be alleviated or reduced to the patient’s comfort goal  Description: Target End Date:  Prior to discharge or change in level of care    1.  Document pain using the appropriate pain scale per order or unit policy  2.  Administer pain medications per provider order and/or assist with epidural/spinal placement as needed  3.  Pain management medications as ordered  4.  Educate and implement non-pharmacologic comfort measures (i.e. relaxation, distraction, massage, cold/heat therapy, etc.)  5.  Assess for nonverbal signs of ineffective coping with pain and offer pain medication and/or epidural anesthesia  Outcome: Progressing  Note: Patient educated on pain interventions and encouraged to express to RN pain needs.        Patient is not progressing towards the following goals:

## 2024-05-25 NOTE — OP REPORT
PREOPERATIVE DIAGNOSIS:   Di/Di twin gestation @ 36 2/7 weeks   Active labor with SROM  Vertex/Breech presentation    POSTOPERATIVE DIAGNOSIS:  Same    PROCEDURE: Primary Low Transverse  Section via Pfannensteil    SURGEON: Linda Queen MD    ASSISTANT: Dr Maddi Dumont MD    ANESTHESIA: Marino Correia MD    COMPLICATIONS: none    EBL:  750 cc    FLUIDS: 1000 cc LR    URINE OUTPUT: 200 cc clear urine    FINDINGS: Twin A Viable male infant in vertex presentation with clear fluid, apgars 8 and 9, weight pending. Twin B viable male in breech presentation with clear fluid and Apgars 8 and 9. Normal uterus, tubes, ovaries.     PROCEDURE IN DETAIL: The patient was taken to the operating room where  spinal anesthesia was found to be adequate.  She was then prepped and draped in the normal sterile fashion in the dorsal supine position with a leftward hip tilt. Of note she has very large and edematous stretch marks across the lower abdomen. A Pfannenstiel skin incision was then made with the scalpel and carried through to the underlying layer of fascia, which was nicked in the midline and the incision was extended laterally with the Nuno scissors.  The superior aspect of the fascial incision was then grasped with Kocher clamps, elevated, and the underlying rectus muscles dissected off sharply.  Attention was then turned to the inferior aspect of this incision which, in a similar fashion, was grasped, tented up with Kocher clamps, and the rectus muscle was dissected off sharply.  The rectus muscles were then  in the midline, and the peritoneum was identified, tented up, and entered sharply with the Metzenbaum scissors.  The peritoneal incision was then extended superiorly and inferiorly with good visualization of the bladder.  The Gael-O retractor was placed, checked for safe positioning, and tightened into place.  The vesicouterine peritoneum identified, grasped with pickups, and entered sharply with  the Metzenbaum scissors.  This incision was then extended laterally and the bladder flap was created digitally.  The lower uterine segment incised in a transverse fashion with the scalpel.  The uterine incision was then extended manually with fingers.  The infant's head was delivered atraumatically.  NUchal cord discovered and easily reduced. The remainder of the infant was delivered without difficulty.  The infant's nose and mouth were bulb suctioned on the field.  The cord was clamped and cut after approximately 30 seconds. The infant was then handed off to the awaiting attendant. The membranes of baby B were bulging through the incision and was ruptured with an allis clamp with clear fluid. The buttocks were spontaneously delivered, followed by the legs and the remainder of the infant delivered spontaneously. The cord was clamped after 30 seconds and cut and the The infant was then handed off to the awaiting attendant.  .  The placentas were then removed by gentle traction on the cord and exterior uterine massage,there were clearly two separate placentas and the membranes peeled apart in the middle,  the uterus exteriorized, and cleared of all clot and debris with a dry laparotomy sponge.  The uterine incision was repaired with 1-0 chromic in a running, locked fashion.  A second layer of the same suture was used to obtain excellent hemostasis.  The uterus was returned to the abdomen and the gutters were cleared of all clot and debris.  The tubes and ovaries are examined and appear normal bilaterally. The peritoneum was closed with 3-0 Vicryl.  The fascia was approximated with 0 Vicryl in a running fashion.  The subcuticular fat was irrigated and closed with a running 3-0 vicryl suture. Several areas of oozing were made hemostatic with electrocautery. The skin was closed with 4-0 monocryl on a Gary needle. There is a small hematoma in the skin in a stretch valentino in the midline of the incision inferiorly that is  1cm and is watched for 5 minutes and no expansion noted. Mepelex dressing applied. Pressure dressing then applied. The patient tolerated the procedure well.  Sponge lap and needle counts were correct x3.  The patient was taken to the recovery room in stable condition with her  infants.

## 2024-05-26 LAB
ERYTHROCYTE [DISTWIDTH] IN BLOOD BY AUTOMATED COUNT: 42.5 FL (ref 35.9–50)
HCT VFR BLD AUTO: 24.9 % (ref 37–47)
HGB BLD-MCNC: 8.4 G/DL (ref 12–16)
MCH RBC QN AUTO: 28.3 PG (ref 27–33)
MCHC RBC AUTO-ENTMCNC: 33.7 G/DL (ref 32.2–35.5)
MCV RBC AUTO: 83.8 FL (ref 81.4–97.8)
PLATELET # BLD AUTO: 217 K/UL (ref 164–446)
PMV BLD AUTO: 11.6 FL (ref 9–12.9)
RBC # BLD AUTO: 2.97 M/UL (ref 4.2–5.4)
WBC # BLD AUTO: 21.5 K/UL (ref 4.8–10.8)

## 2024-05-26 PROCEDURE — A9270 NON-COVERED ITEM OR SERVICE: HCPCS | Performed by: ANESTHESIOLOGY

## 2024-05-26 PROCEDURE — 700102 HCHG RX REV CODE 250 W/ 637 OVERRIDE(OP): Performed by: ANESTHESIOLOGY

## 2024-05-26 PROCEDURE — A9270 NON-COVERED ITEM OR SERVICE: HCPCS

## 2024-05-26 PROCEDURE — 700102 HCHG RX REV CODE 250 W/ 637 OVERRIDE(OP)

## 2024-05-26 PROCEDURE — 770002 HCHG ROOM/CARE - OB PRIVATE (112)

## 2024-05-26 PROCEDURE — 700111 HCHG RX REV CODE 636 W/ 250 OVERRIDE (IP): Mod: JZ

## 2024-05-26 PROCEDURE — 700111 HCHG RX REV CODE 636 W/ 250 OVERRIDE (IP): Mod: JZ | Performed by: ANESTHESIOLOGY

## 2024-05-26 RX ORDER — ASCORBIC ACID 500 MG
500 TABLET ORAL
Status: DISCONTINUED | OUTPATIENT
Start: 2024-05-26 | End: 2024-05-27 | Stop reason: HOSPADM

## 2024-05-26 RX ORDER — FERROUS SULFATE 325(65) MG
325 TABLET ORAL
Status: DISCONTINUED | OUTPATIENT
Start: 2024-05-26 | End: 2024-05-27 | Stop reason: HOSPADM

## 2024-05-26 RX ORDER — MEDROXYPROGESTERONE ACETATE 150 MG/ML
150 INJECTION, SUSPENSION INTRAMUSCULAR ONCE
Status: ACTIVE | OUTPATIENT
Start: 2024-05-26 | End: 2024-05-27

## 2024-05-26 RX ADMIN — ACETAMINOPHEN 1000 MG: 500 TABLET, FILM COATED ORAL at 04:04

## 2024-05-26 RX ADMIN — KETOROLAC TROMETHAMINE 15 MG: 15 INJECTION, SOLUTION INTRAMUSCULAR; INTRAVENOUS at 04:04

## 2024-05-26 RX ADMIN — ENOXAPARIN SODIUM 40 MG: 100 INJECTION SUBCUTANEOUS at 17:57

## 2024-05-26 RX ADMIN — KETOROLAC TROMETHAMINE 15 MG: 15 INJECTION, SOLUTION INTRAMUSCULAR; INTRAVENOUS at 09:54

## 2024-05-26 RX ADMIN — OXYCODONE HYDROCHLORIDE AND ACETAMINOPHEN 500 MG: 500 TABLET ORAL at 09:54

## 2024-05-26 RX ADMIN — ACETAMINOPHEN 1000 MG: 500 TABLET, FILM COATED ORAL at 22:06

## 2024-05-26 RX ADMIN — FERROUS SULFATE TAB 325 MG (65 MG ELEMENTAL FE) 325 MG: 325 (65 FE) TAB at 09:54

## 2024-05-26 RX ADMIN — ACETAMINOPHEN 1000 MG: 500 TABLET, FILM COATED ORAL at 11:10

## 2024-05-26 RX ADMIN — IBUPROFEN 800 MG: 800 TABLET, FILM COATED ORAL at 17:57

## 2024-05-26 RX ADMIN — ACETAMINOPHEN 1000 MG: 500 TABLET, FILM COATED ORAL at 17:56

## 2024-05-26 ASSESSMENT — PAIN DESCRIPTION - PAIN TYPE
TYPE: SURGICAL PAIN

## 2024-05-26 NOTE — CARE PLAN
The patient is Stable - Low risk of patient condition declining or worsening    Shift Goals  Clinical Goals: rest, pain control, VSS  Patient Goals: Healthy mom, Healthy babies  Family Goals: Support    Progress made toward(s) clinical / shift goals:  Able to ambulate without assistance. Pain controlled.  Problem: Altered Physiologic Condition  Goal: Patient physiologically stable as evidenced by normal lochia, palpable uterine involution and vitals within normal limits  Outcome: Progressing  Note: Fundus firm at U, lochia rubra minimal. Surgical incision with pressure dressing CDI. VSS     Problem: Pain - Standard  Goal: Alleviation of pain or a reduction in pain to the patient’s comfort goal  Outcome: Progressing  Note: Pain level within patient comfort goal. Medicated as scheduled/needed.        Patient is not progressing towards the following goals:

## 2024-05-26 NOTE — ANESTHESIA POSTPROCEDURE EVALUATION
Patient: Gertrudis Mata    Procedure Summary       Date: 24 Room / Location: [facility] OR 02 / SURGERY LABOR AND DELIVERY    Anesthesia Start: 934 Anesthesia Stop:     Procedure:  SECTION, PRIMARY (Abdomen) Diagnosis: (delivered)    Surgeons: Linda Queen M.D. Responsible Provider: Marino Correia M.D.    Anesthesia Type: spinal ASA Status: 3 - Emergent            Final Anesthesia Type: spinal  Last vitals  BP   Blood Pressure: 138/74    Temp   36.2 °C (97.2 °F)    Pulse   76   Resp   18    SpO2   96 %      Anesthesia Post Evaluation    Patient location during evaluation: PACU  Patient participation: complete - patient participated  Level of consciousness: awake and alert  Pain score: 0    Airway patency: patent  Anesthetic complications: no  Cardiovascular status: hemodynamically stable  Respiratory status: acceptable  Hydration status: euvolemic    PONV: none          There were no known notable events for this encounter.     Nurse Pain Score: 0 (NPRS)

## 2024-05-26 NOTE — PROGRESS NOTES
Obstetrics & Gynecology Post-Delivery Progress Note    Date of Service    23 y.o.  s/p  for Di-di twin pregnancy, Baby B in breech presentation, and SROM/active labor  Delivery date: 24 @ 10:06AM    Subjective  Pt reports while she is resting in bed she does not have pain, but the pain does increase with ambulating. Denies any nausea, vomiting.     Pain: Well controlled  Bleeding: lochia minimal  Tolerating PO: yes  Voiding: without difficulty  Ambulating: yes  Passing flatus: Yes  Feeding: breastfeeding with some difficulty due to latch    Objective  24hr VS:  Temp:  [35.9 °C (96.7 °F)-37.1 °C (98.7 °F)] 36.8 °C (98.2 °F)  Pulse:  [74-97] 76  Resp:  [16-20] 18  BP: (119-171)/(58-96) 119/71  SpO2:  [90 %-98 %] 95 %    Physical Exam  Gen: well appearing, no apparent distress, resting comfortably in bed  CV: rrr, no m/r/g  Resp: CTAB, symmetric breath sounds  Abd: soft, nontender, nondistended  Fundus: firm, below umbilicus, and tender  Incision:  Pressure dressing in place, dressing is clean, dry, and intact  Ext: symmetric, no peripheral edema, calves nontender    Labs:  Recent Labs     24  0710 24  2255   WBC 12.0* 21.5*   RBC 3.79* 2.97*   HEMOGLOBIN 10.8* 8.4*   HEMATOCRIT 31.4* 24.9*   MCV 82.8 83.8   MCH 28.5 28.3   RDW 41.6 42.5   PLATELETCT 234 217   MPV 11.7 11.6   NEUTSPOLYS 62.90  --    LYMPHOCYTES 26.60  --    MONOCYTES 7.80  --    EOSINOPHILS 1.60  --    BASOPHILS 0.50  --      Medications  oxytocin, 20 Units, Intravenous, Once  acetaminophen, 1,000 mg, Oral, Q6HR  ketorolac, 15 mg, Intravenous, Q6HR  ibuprofen, 800 mg, Oral, Q8HRS  acetaminophen, 1,000 mg, Oral, Q6HRS  enoxaparin (LOVENOX) injection, 40 mg, Subcutaneous, DAILY AT 1800      PRN medications: oxyCODONE immediate-release, oxyCODONE immediate release, HYDROmorphone, HYDROmorphone, ePHEDrine, ondansetron, diphenhydrAMINE, diphenhydrAMINE **OR** diphenhydrAMINE **OR** naloxone HCl (Narcan) 20 mg in  mL  infusion, LR, ibuprofen **FOLLOWED BY** [START ON 2024] ibuprofen, acetaminophen **FOLLOWED BY** [START ON 2024] acetaminophen, oxyCODONE immediate-release, oxyCODONE immediate release, ondansetron **OR** ondansetron, diphenhydrAMINE **OR** diphenhydrAMINE, polyethylene glycol/lytes, simethicone    Assessment/Plan  Gertrudis Mata is a 23 y.o.yo  postpartum day #1  s/p  for Di-di twin pregnancy, Baby B in breech presentation, and SROM/active labor    - Post care:  Meeting all goals . Plan will be for removal of pressure dressing today.   - Pain: controlled  - Rh+, Rubella Immune  - Method of Feeding: plans to breastfeed  - Method of Contraception: DepoProvera    VTE prophylaxis: lovenox 40mg subQ daily and SCDs    - Disposition: likely home PPD2     Maddi Dumont M.D.  PGY-1, [facility]

## 2024-05-26 NOTE — DISCHARGE PLANNING
Discharge Planning Assessment Post Partum    Completed chart review. Met with mother at  PP bedside  Reason for Referral: history of depression/anxiety  Address: [address] [place] [address] [address]  Type of Living Situation:stable  Mom Diagnosis: post partum  Baby Diagnosis:   Primary Language: english    Names of Babies: Cristóbal and Adolfo  Father of the Baby: Stone Saleh  Involved in baby’s care? yes  Contact Information: [phone]    Prenatal Care: [facility]  Mom's PCP: no  PCP for new baby:provided Pediatrician list    Support System: family  Coping/Bonding between mother & baby: appropriate   Source of Feeding: breast/bottle   Supplies for Infant: prepared    Mom's Insurance: [employer]  Baby Covered on Insurance:yes  Mother Employed/School: worked at [employer] - not going to return to care for infants  Father: CNA at [employer]  Other children in the home/names & ages: no    Financial Hardship/Income: denies   Mom's Mental status: alert and oriented  Services used prior to admit: no    CPS History: no  Psychiatric History: provided PP support.   Domestic Violence History: denies  Drug/ETOH History: denies    Resources Provided: PP support, community resources, [employer]  Referrals Made: diaper bank     Clearance for Discharge: Discharge to parent when ready.

## 2024-05-26 NOTE — CARE PLAN
The patient is Stable - Low risk of patient condition declining or worsening    Shift Goals  Clinical Goals: Pain management, rest, and 3 step plan for babies    Progress made toward(s) clinical / shift goals:     Problem: Psychosocial - Postpartum  Goal: Patient will verbalize and demonstrate effective bonding and parenting behavior  Outcome: Progressing  Pt interacting with infants appropriately and completing infants cares with FOB at bedside.     Problem: Altered Physiologic Condition  Goal: Patient physiologically stable as evidenced by normal lochia, palpable uterine involution and vitals within normal limits  Outcome: Progressing       Patient is not progressing towards the following goals:

## 2024-05-26 NOTE — PROGRESS NOTES
0815 - Received report from Stephenie CASTRO at change of shift. Assumed care. Assessment completed. Fundus firm, lochia scant. Abdominal incision with dressing intact. Plan of care reviewed. Pt will call if med intervention needed. Educated parents to offer feedings on cue, at minimum of Q3 hours and to update I&O chart. Educated parents on safe sleep and importance of keeping infant dressed and swaddled. Parents verbalized understanding. Encouraged parents to call for assistance when needed. Call light within reach. All questions and concerns answered at this time. Car seat challenge discussed with family. FOB states he will grab car seats later today.    1630 - MOB and FOB desire to do car seat challenge later in the night. FOB to grab car seats after this feed.

## 2024-05-27 ENCOUNTER — PHARMACY VISIT (OUTPATIENT)
Dept: PHARMACY | Facility: MEDICAL CENTER | Age: 24
End: 2024-05-27
Payer: COMMERCIAL

## 2024-05-27 VITALS
RESPIRATION RATE: 18 BRPM | WEIGHT: 293 LBS | DIASTOLIC BLOOD PRESSURE: 86 MMHG | BODY MASS INDEX: 50.02 KG/M2 | SYSTOLIC BLOOD PRESSURE: 141 MMHG | TEMPERATURE: 99.2 F | HEIGHT: 64 IN | OXYGEN SATURATION: 99 % | HEART RATE: 85 BPM

## 2024-05-27 PROBLEM — I10 HYPERTENSION: Status: ACTIVE | Noted: 2024-05-27

## 2024-05-27 LAB
ERYTHROCYTE [DISTWIDTH] IN BLOOD BY AUTOMATED COUNT: 44.7 FL (ref 35.9–50)
HCT VFR BLD AUTO: 25.9 % (ref 37–47)
HGB BLD-MCNC: 8.5 G/DL (ref 12–16)
MCH RBC QN AUTO: 28.1 PG (ref 27–33)
MCHC RBC AUTO-ENTMCNC: 32.8 G/DL (ref 32.2–35.5)
MCV RBC AUTO: 85.5 FL (ref 81.4–97.8)
PLATELET # BLD AUTO: 246 K/UL (ref 164–446)
PMV BLD AUTO: 11.4 FL (ref 9–12.9)
RBC # BLD AUTO: 3.03 M/UL (ref 4.2–5.4)
WBC # BLD AUTO: 14.2 K/UL (ref 4.8–10.8)

## 2024-05-27 PROCEDURE — 700111 HCHG RX REV CODE 636 W/ 250 OVERRIDE (IP): Mod: JZ | Performed by: STUDENT IN AN ORGANIZED HEALTH CARE EDUCATION/TRAINING PROGRAM

## 2024-05-27 PROCEDURE — A9270 NON-COVERED ITEM OR SERVICE: HCPCS | Performed by: STUDENT IN AN ORGANIZED HEALTH CARE EDUCATION/TRAINING PROGRAM

## 2024-05-27 PROCEDURE — 36415 COLL VENOUS BLD VENIPUNCTURE: CPT

## 2024-05-27 PROCEDURE — A9270 NON-COVERED ITEM OR SERVICE: HCPCS

## 2024-05-27 PROCEDURE — 700102 HCHG RX REV CODE 250 W/ 637 OVERRIDE(OP)

## 2024-05-27 PROCEDURE — 85027 COMPLETE CBC AUTOMATED: CPT

## 2024-05-27 PROCEDURE — RXMED WILLOW AMBULATORY MEDICATION CHARGE: Performed by: STUDENT IN AN ORGANIZED HEALTH CARE EDUCATION/TRAINING PROGRAM

## 2024-05-27 PROCEDURE — 700102 HCHG RX REV CODE 250 W/ 637 OVERRIDE(OP): Performed by: STUDENT IN AN ORGANIZED HEALTH CARE EDUCATION/TRAINING PROGRAM

## 2024-05-27 RX ORDER — IBUPROFEN 600 MG/1
600 TABLET ORAL EVERY 6 HOURS PRN
Qty: 30 TABLET | Refills: 0 | Status: SHIPPED | OUTPATIENT
Start: 2024-05-27

## 2024-05-27 RX ORDER — LABETALOL 100 MG/1
100 TABLET, FILM COATED ORAL TWICE DAILY
Status: DISCONTINUED | OUTPATIENT
Start: 2024-05-27 | End: 2024-05-27 | Stop reason: HOSPADM

## 2024-05-27 RX ORDER — MEDROXYPROGESTERONE ACETATE 150 MG/ML
150 INJECTION, SUSPENSION INTRAMUSCULAR ONCE
Status: DISCONTINUED | OUTPATIENT
Start: 2024-05-27 | End: 2024-05-27

## 2024-05-27 RX ORDER — FERROUS SULFATE 325(65) MG
325 TABLET ORAL
Qty: 30 TABLET | Refills: 0 | Status: SHIPPED | OUTPATIENT
Start: 2024-05-28

## 2024-05-27 RX ORDER — LABETALOL 100 MG/1
100 TABLET, FILM COATED ORAL 2 TIMES DAILY
Qty: 120 TABLET | Refills: 0 | Status: SHIPPED | OUTPATIENT
Start: 2024-05-28

## 2024-05-27 RX ORDER — ACETAMINOPHEN 325 MG/1
650 TABLET ORAL EVERY 6 HOURS PRN
Qty: 30 TABLET | Refills: 0 | Status: SHIPPED | OUTPATIENT
Start: 2024-05-27

## 2024-05-27 RX ORDER — MEDROXYPROGESTERONE ACETATE 150 MG/ML
150 INJECTION, SUSPENSION INTRAMUSCULAR ONCE
Status: COMPLETED | OUTPATIENT
Start: 2024-05-27 | End: 2024-05-27

## 2024-05-27 RX ORDER — OXYCODONE HYDROCHLORIDE AND ACETAMINOPHEN 5; 325 MG/1; MG/1
1 TABLET ORAL EVERY 6 HOURS PRN
Qty: 20 TABLET | Refills: 0 | Status: SHIPPED | OUTPATIENT
Start: 2024-05-27 | End: 2024-06-01

## 2024-05-27 RX ORDER — OXYCODONE HYDROCHLORIDE 5 MG/1
5 TABLET ORAL EVERY 6 HOURS PRN
Qty: 20 TABLET | Refills: 0 | Status: SHIPPED | OUTPATIENT
Start: 2024-05-27 | End: 2024-05-27

## 2024-05-27 RX ORDER — ASCORBIC ACID 500 MG
500 TABLET ORAL
Qty: 30 TABLET | Refills: 0 | Status: SHIPPED | OUTPATIENT
Start: 2024-05-28

## 2024-05-27 RX ORDER — ACETAMINOPHEN 500 MG
1000 TABLET ORAL EVERY 6 HOURS PRN
Qty: 30 TABLET | Refills: 0 | Status: SHIPPED | OUTPATIENT
Start: 2024-05-27 | End: 2024-05-27

## 2024-05-27 RX ADMIN — MEDROXYPROGESTERONE ACETATE 150 MG: 150 INJECTION, SUSPENSION INTRAMUSCULAR at 11:49

## 2024-05-27 RX ADMIN — IBUPROFEN 800 MG: 800 TABLET, FILM COATED ORAL at 01:53

## 2024-05-27 RX ADMIN — ACETAMINOPHEN 1000 MG: 500 TABLET, FILM COATED ORAL at 10:50

## 2024-05-27 RX ADMIN — IBUPROFEN 800 MG: 800 TABLET, FILM COATED ORAL at 10:50

## 2024-05-27 RX ADMIN — LABETALOL HYDROCHLORIDE 100 MG: 100 TABLET, FILM COATED ORAL at 15:06

## 2024-05-27 RX ADMIN — ACETAMINOPHEN 1000 MG: 500 TABLET, FILM COATED ORAL at 04:22

## 2024-05-27 ASSESSMENT — EDINBURGH POSTNATAL DEPRESSION SCALE (EPDS)
I HAVE FELT SCARED OR PANICKY FOR NO GOOD REASON: NO, NOT AT ALL
I HAVE BLAMED MYSELF UNNECESSARILY WHEN THINGS WENT WRONG: NO, NEVER
I HAVE BEEN ABLE TO LAUGH AND SEE THE FUNNY SIDE OF THINGS: AS MUCH AS I ALWAYS COULD
THE THOUGHT OF HARMING MYSELF HAS OCCURRED TO ME: NEVER
I HAVE BEEN SO UNHAPPY THAT I HAVE BEEN CRYING: NO, NEVER
I HAVE FELT SAD OR MISERABLE: NO, NOT AT ALL
I HAVE BEEN ANXIOUS OR WORRIED FOR NO GOOD REASON: NO, NOT AT ALL
THINGS HAVE BEEN GETTING ON TOP OF ME: NO, I HAVE BEEN COPING AS WELL AS EVER
I HAVE BEEN SO UNHAPPY THAT I HAVE HAD DIFFICULTY SLEEPING: NOT AT ALL
I HAVE LOOKED FORWARD WITH ENJOYMENT TO THINGS: AS MUCH AS I EVER DID

## 2024-05-27 ASSESSMENT — PAIN DESCRIPTION - PAIN TYPE
TYPE: SURGICAL PAIN

## 2024-05-27 NOTE — DISCHARGE SUMMARY
Discharge Summary:     Date of Admission: 2024  Date of Discharge: 24      Admitting diagnosis:    1. Pregnancy @ 36w2d  2. SROM/labor  3. Di-di twin gestation with malpresentation      Discharge Diagnosis:   1. Status post primary C/S for di-di twin gestation with malpresentation.  2. Acute postoperative pain  3. Postpartum anemia  4. Hypertension, likely chronic    Past Medical History:   Diagnosis Date    Anxiety     Depression     Joint dislocation     Migraine      OB History    Para Term  AB Living   1 1 0 1 0 2   SAB IAB Ectopic Molar Multiple Live Births   0 0 0 0 1 2      # Outcome Date GA Lbr [place]/2nd Weight Sex Delivery Anes PTL Lv   1A  24 36w2d  2.57 kg (5 lb 10.7 oz) M CS-LVertical Spinal Y KEO   1B  24 36w2d  2.8 kg (6 lb 2.8 oz) M CS-LTranv Spinal Y KEO     Past Surgical History:   Procedure Laterality Date    EYE SURGERY Left      Patient has no known allergies.    Patient Active Problem List   Diagnosis    Dichorionic diamniotic twin gestation    Migraine without status migrainosus, not intractable    Obesity in pregnancy    Joint laxity    Labor and delivery, indication for care    Hypertension       Hospital Course:   Pt is a 23 y.o. now  who presented on 2024 s/p SROM and in early labor. She was consented for and delivered via primary C/S for di-di twin gestation with malpresentation. Twin A Apgars 8, 9 at 1 and 5 minutes respectively and Twin B Apgars 8,9 at 1 and 5 minutes respectively.  ml.     Postpartum course notable for early ambulation, well managed pain, tolerance of diet, spontaneous voiding, and appropriate feeding of infant. She has remained afebrile. All maternal questions and concerns addressed    Mildly elevated blood pressures noted on day of discharge, 140s/80s-90s. Patient reports that she has previously been treated with verapamil (prior to pregnancy) and another antihypertensive which she did not  tolerate. While no blood pressures <20 weeks were documented >140/90 during this pregnancy, several approached this and were >135/85 throughout pregnancy. She denies headache, vision changes, CP, dyspnea, RUQ/epigastric pain. She has some lower extremity edema that is symmetric and stable.     Physical Exam:  Temp:  [36.3 °C (97.4 °F)-37.3 °C (99.2 °F)] 37.3 °C (99.2 °F)  Pulse:  [82-87] 85  Resp:  [18-20] 18  BP: (137-149)/(85-91) 141/86  SpO2:  [97 %-99 %] 99 %  Physical Exam  General: well appearing, no apparent distress  See progress note from same date for full physical exam    Current Facility-Administered Medications   Medication Dose    labetalol (Normodyne) tablet 100 mg  100 mg    ferrous sulfate tablet 325 mg  325 mg    ascorbic acid (Vitamin C) tablet 500 mg  500 mg    LR infusion      oxytocin (Pitocin) infusion (for post delivery)  125 mL/hr    electrolyte-A (Plasmalyte-A) infusion 500 mL  500 mL    lactated ringers infusion      ibuprofen (Motrin) tablet 800 mg  800 mg    Followed by    [START ON 5/29/2024] ibuprofen (Motrin) tablet 800 mg  800 mg    acetaminophen (Tylenol) tablet 1,000 mg  1,000 mg    Followed by    [START ON 5/29/2024] acetaminophen (Tylenol) tablet 1,000 mg  1,000 mg    oxyCODONE immediate-release (Roxicodone) tablet 5 mg  5 mg    oxyCODONE immediate release (Roxicodone) tablet 10 mg  10 mg    ondansetron (Zofran) syringe/vial injection 4 mg  4 mg    Or    ondansetron (Zofran ODT) dispertab 4 mg  4 mg    diphenhydrAMINE (Benadryl) tablet/capsule 25 mg  25 mg    Or    diphenhydrAMINE (Benadryl) injection 25 mg  25 mg    enoxaparin (Lovenox) inj 40 mg  40 mg    polyethylene glycol/lytes (Miralax) Packet 1 Packet  1 Packet    simethicone (Mylicon) chewable tablet 125 mg  125 mg       Recent Labs     05/25/24  0710 05/25/24  2255 05/27/24  0457   WBC 12.0* 21.5* 14.2*   RBC 3.79* 2.97* 3.03*   HEMOGLOBIN 10.8* 8.4* 8.5*   HEMATOCRIT 31.4* 24.9* 25.9*   MCV 82.8 83.8 85.5   MCH 28.5  28.3 28.1   MCHC 34.4 33.7 32.8   RDW 41.6 42.5 44.7   PLATELETCT 234 217 246   MPV 11.7 11.6 11.4         Activity/ Discharge Instructions::   Discharge to home  Pelvic Rest x 6 weeks  No heavy lifting x4 weeks  Call or come to ED for: heavy vaginal bleeding, fever >100.4, severe abdominal pain, severe headache, chest pain, shortness of breath,  N/V, incisional drainage, or other concerns.  Home blood pressures 1-2 x daily; need to return to hospital if SBP >160 or DBP >110 or if symptoms of end-organ damage present.       Follow up:  [facility] in 1 week for incision check and blood pressure check.      Discharge Meds:   Current Outpatient Medications   Medication Sig Dispense Refill    [START ON 5/28/2024] ascorbic acid (VITAMIN C) 500 MG tablet Take 1 Tablet by mouth every 48 hours. 30 Tablet 0    [START ON 5/28/2024] ferrous sulfate 325 (65 Fe) MG tablet Take 1 Tablet by mouth every 48 hours. 30 Tablet 0    [START ON 5/28/2024] labetalol (NORMODYNE) 100 MG Tab Take 1 Tablet by mouth 2 times a day. 120 Tablet 0    ibuprofen (MOTRIN) 600 MG Tab Take 1 Tablet by mouth every 6 hours as needed for Moderate Pain or Mild Pain. 30 Tablet 0    acetaminophen (TYLENOL) 325 MG Tab Take 2 Tablets by mouth every 6 hours as needed for Moderate Pain or Mild Pain. Do not exceed 4,000 mg in a 24 hour period, including acetaminophen in other forms (I.e. Percocet) 30 Tablet 0    oxyCODONE-acetaminophen (PERCOCET) 5-325 MG Tab Take 1 Tablet by mouth every 6 hours as needed for Severe Pain for up to 5 days. 20 Tablet 0           Alesha Hall M.D.

## 2024-05-27 NOTE — PROGRESS NOTES
Report received from Cole CASTRO, patient is ambulating inside the room and denies pain at this time. Plan of care on going.

## 2024-05-27 NOTE — CARE PLAN
The patient is Stable - Low risk of patient condition declining or worsening    Shift Goals  Clinical Goals: rest, pain control, breastfeeding support.  Patient Goals: Healthy mom, Healthy babies  Family Goals: Support    Progress made toward(s) clinical / shift goals:  Able to take care of the twins and her   Problem: Altered Physiologic Condition  Goal: Patient physiologically stable as evidenced by normal lochia, palpable uterine involution and vitals within normal limits  Outcome: Progressing  Note: Fundus firm at U-1, lochia rubra minimal. Surgical incision with island dressing intact. VSS     Problem: Pain - Standard  Goal: Alleviation of pain or a reduction in pain to the patient’s comfort goal  Outcome: Progressing  Note: Pain level within patient comfort goal. Medicated as scheduled/ needed.    self.     Patient is not progressing towards the following goals:

## 2024-05-27 NOTE — DISCHARGE INSTRUCTIONS

## 2024-05-27 NOTE — LACTATION NOTE
This note was copied from a baby's chart.  Gertrudis was feeding a bottle of formula this morning at time of my visit. She is using paced bottle feeding method.     She feels comfortable with breast pumping and is not planning to rent a hospital grade breast pump for use at home. I did advise her that this might be really helpful in stimulating milk as the one she has at home is a hands-free.    Gertrudis was feeling pretty tired and anticipating having a shower. Her partner Stone was not in the room at this time so I offered to come back and discuss plan for home with him present so they both have the opportunity to ask questions.    CDC guidelines on milk handling and discharge resources for lactation follow up were provided.    69 year old female with a history of GERD, COPD (not on home o2) who presents with abdominal pain, found to have hypercapnic respiratory failure in the setting of RLL PE and likely COPD exacerbation, with evidence of R heart strain and elevated cardiac enzymes, s/p intubation on 11/7 and subsequent extubation on 11/9, now downgraded to telemetry.

## 2024-05-27 NOTE — PROGRESS NOTES
0800 - Received report from Stephenie CASTRO at change of shift. Assumed care. Assessment completed. Fundus firm, lochia scant. Abdominal incision with dressing intact. Plan of care reviewed. Pt will call if med intervention needed. Educated parents to offer feedings on cue, at minimum of Q3 hours and to update I&O chart. Educated parents on safe sleep and importance of keeping infant dressed and swaddled. Parents verbalized understanding. Encouraged parents to call for assistance when needed. Call light within reach. All questions and concerns answered at this time.   Depo consent signed at this time.     1020 - Per Dr. Hall, will need to monitor patient's BP a few more times prior to discharge today. Will notify MD if patient's BP remains elevated or increases.     1140 - Notified Dr. Hall of patient's BP of 149/91. MD to come assess patient later today at bedside.    1635 - Discharge paperwork for pt and infant discussed with parents at bedside. All questions answered. Follow up appointment to be made by patient. NBS #2 dates given to parents. Parents verbalize understanding. Paperwork signed and dated at this time.     1655 - Infant discharged in car seat with parents. Infant placed in car seat by parents and checked by RN. Bands verified. Cuddles removed.

## 2024-05-27 NOTE — LACTATION NOTE
I met with Stone and Gertrudis to offer more lactation support. We reviewed the feeding guidelines for volume together and they report good understanding of this for the next week.     Gertrudis was not open to assistance with latch at this time but did agree to a referral for follow-up out-patient.    They were offered the opportunity to ask more questions today as they require.

## 2024-05-27 NOTE — PROGRESS NOTES
Post-Partum Progress Note    Gertrudis is a 23 y.o.  on post-op day #2 following primary C/S for twin gestation with malpresentation:    Subjective:   Pt reports overall feeling well. Pain is tolerable with PO pain medication.  Pt is ambulating, has voided spontaneously, and is  passing flatus.  She is tolerating PO.  Reports her bleeding is similar to a period.    Denies H/A, vision changes, CP, dyspnea, or RUQ/epigastric pain. She does report persistent lower extremity swelling.  Breast feeding: yes, has pump, awaiting milk to come in. Supplementing appropriately.      Patient Vitals for the past 24 hrs:   BP Temp Temp src Pulse Resp SpO2   24 0600 (!) 144/88 36.8 °C (98.3 °F) Temporal 82 19 98 %   24 1800 137/85 36.8 °C (98.3 °F) Temporal 87 20 98 %       No intake or output data in the 24 hours ending 24 1002  Gen: Alert, conversant, NAD  CV: RRR, nl S1 and S2 without murmur, cap refill <2 sec  Resp: Unlabored, symmetric chest rise, clear to auscultation bilaterally  Abd: soft, ND, appropriately tender to palpation, no rebound/guarding, +BS; fundus palpable below umbilcus   Incision: mepilex dressing in place with minimal strike-through  Ext: NT, +1 edema bilaterally symmetric to mid shin    Meds:   Current Facility-Administered Medications:     ferrous sulfate tablet 325 mg, 325 mg, Oral, Q48HRS, Maddi Dumont M.D., 325 mg at 24 0954    ascorbic acid (Vitamin C) tablet 500 mg, 500 mg, Oral, Q48HRS, Maddi Dumont M.D., 500 mg at 24 0954    LR infusion, , Intravenous, Continuous, TRICE MckinneyOElyse    [] oxytocin (Pitocin) infusion bolus (for post delivery), 20 Units, Intravenous, Once **FOLLOWED BY** oxytocin (Pitocin) infusion (for post delivery), 125 mL/hr, Intravenous, Continuous, LORENZO Mckinney.O., Last Rate: 125 mL/hr at 24 1213, 125 mL/hr at 24 1213    electrolyte-A (Plasmalyte-A) infusion 500 mL, 500 mL, Intravenous, Continuous,  Marino Correia M.D.    lactated ringers infusion, , Intravenous, PRN, Maddi Dumont M.D., Last Rate: 125 mL/hr at 05/25/24 1634, New Bag at 05/25/24 1634    ibuprofen (Motrin) tablet 800 mg, 800 mg, Oral, Q8HRS, 800 mg at 05/27/24 0153 **FOLLOWED BY** [START ON 5/29/2024] ibuprofen (Motrin) tablet 800 mg, 800 mg, Oral, Q8HRS PRN, Maddi Dumont M.D.    acetaminophen (Tylenol) tablet 1,000 mg, 1,000 mg, Oral, Q6HRS, 1,000 mg at 05/27/24 0422 **FOLLOWED BY** [START ON 5/29/2024] acetaminophen (Tylenol) tablet 1,000 mg, 1,000 mg, Oral, Q6HRS PRN, Maddi Dumont M.D.    oxyCODONE immediate-release (Roxicodone) tablet 5 mg, 5 mg, Oral, Q4HRS PRN, Maddi Dumont M.D.    oxyCODONE immediate release (Roxicodone) tablet 10 mg, 10 mg, Oral, Q4HRS PRN, Maddi Dumont M.D.    ondansetron (Zofran) syringe/vial injection 4 mg, 4 mg, Intravenous, Q6HRS PRN **OR** ondansetron (Zofran ODT) dispertab 4 mg, 4 mg, Oral, Q6HRS PRN, Maddi Dumont M.D.    diphenhydrAMINE (Benadryl) tablet/capsule 25 mg, 25 mg, Oral, Q6HRS PRN **OR** diphenhydrAMINE (Benadryl) injection 25 mg, 25 mg, Intravenous, Q6HRS PRN, Maddi Dumont M.D.    enoxaparin (Lovenox) inj 40 mg, 40 mg, Subcutaneous, DAILY AT 1800, Maddi Dumont M.D., 40 mg at 05/26/24 1757    polyethylene glycol/lytes (Miralax) Packet 1 Packet, 1 Packet, Oral, QDAY PRN, Maddi REY White, M.D.    simethicone (Mylicon) chewable tablet 125 mg, 125 mg, Oral, 4X/DAY Maddi DRAKE M.D.      Lab:   Recent Results (from the past 48 hour(s))   CBC without differential- Once in 8 hours post delivery    Collection Time: 05/25/24 10:55 PM   Result Value Ref Range    WBC 21.5 (H) 4.8 - 10.8 K/uL    RBC 2.97 (L) 4.20 - 5.40 M/uL    Hemoglobin 8.4 (L) 12.0 - 16.0 g/dL    Hematocrit 24.9 (L) 37.0 - 47.0 %    MCV 83.8 81.4 - 97.8 fL    MCH 28.3 27.0 - 33.0 pg    MCHC 33.7 32.2 - 35.5 g/dL    RDW 42.5 35.9 - 50.0 fL    Platelet Count 217 164 - 446 K/uL    MPV 11.6 9.0 - 12.9 fL    CBC WITHOUT DIFFERENTIAL    Collection Time: 24  4:57 AM   Result Value Ref Range    WBC 14.2 (H) 4.8 - 10.8 K/uL    RBC 3.03 (L) 4.20 - 5.40 M/uL    Hemoglobin 8.5 (L) 12.0 - 16.0 g/dL    Hematocrit 25.9 (L) 37.0 - 47.0 %    MCV 85.5 81.4 - 97.8 fL    MCH 28.1 27.0 - 33.0 pg    MCHC 32.8 32.2 - 35.5 g/dL    RDW 44.7 35.9 - 50.0 fL    Platelet Count 246 164 - 446 K/uL    MPV 11.4 9.0 - 12.9 fL       A/P: 23 y.o.  POD 2 s/p primary  delivery @ 36w2d for SROM with twin gestation and malpresentation.  - Doing well postpartum, meeting all postop milestones; encouraged ambulation, cont routine postop care  - infants: at bedside, breastfeeding and supplementing  - Rh+/RI  - contraception: desires Depo-Provera prior to DC  - ppx: ambulating  - Mildly elevated BP this morning- no symptoms, continue to monitor BP today and if stable/reassuring, plan for discharge later today.    Disposition: anticipate discharge later today if BP stable    Alesha Hall M.D.

## 2024-05-28 ENCOUNTER — APPOINTMENT (OUTPATIENT)
Dept: OBGYN | Facility: CLINIC | Age: 24
End: 2024-05-28
Payer: COMMERCIAL

## 2024-06-03 ENCOUNTER — APPOINTMENT (OUTPATIENT)
Dept: OBGYN | Facility: CLINIC | Age: 24
End: 2024-06-03
Payer: COMMERCIAL

## 2024-06-03 VITALS — SYSTOLIC BLOOD PRESSURE: 135 MMHG | DIASTOLIC BLOOD PRESSURE: 84 MMHG

## 2024-06-11 ENCOUNTER — GYNECOLOGY VISIT (OUTPATIENT)
Dept: OBGYN | Facility: CLINIC | Age: 24
End: 2024-06-11
Payer: COMMERCIAL

## 2024-06-11 VITALS — BODY MASS INDEX: 44.85 KG/M2 | DIASTOLIC BLOOD PRESSURE: 77 MMHG | WEIGHT: 261.3 LBS | SYSTOLIC BLOOD PRESSURE: 136 MMHG

## 2024-06-11 DIAGNOSIS — Z48.89 ENCOUNTER FOR POST SURGICAL WOUND CHECK: ICD-10-CM

## 2024-06-11 PROCEDURE — 3078F DIAST BP <80 MM HG: CPT | Performed by: OBSTETRICS & GYNECOLOGY

## 2024-06-11 PROCEDURE — 0503F POSTPARTUM CARE VISIT: CPT | Performed by: OBSTETRICS & GYNECOLOGY

## 2024-06-11 PROCEDURE — 3075F SYST BP GE 130 - 139MM HG: CPT | Performed by: OBSTETRICS & GYNECOLOGY

## 2024-06-11 ASSESSMENT — FIBROSIS 4 INDEX: FIB4 SCORE: 0.4

## 2024-06-11 NOTE — PROGRESS NOTES
Incision Check     CC: Incision Check     HPI: 23 y.o.  s/p  delivery for deepali twins at 36w2d on 24 with Dr. Queen presents today for routine incision check  Pt reports doing well.  Denies fevers/chills.  Denies incisional redness/pain or drainage.  Normal lochia.    Is exclusively pumping and producing well for both babies     Denies concerns about mood.      Vitals:    24 0920   BP: 136/77      Gen: AAO, NAD  Abd: soft, NT, ND,   Incision C/D/I, healing well     A/P: 23 y.o.  s/p LTCS  - no signs of postop complications  - encouraged BFing, lactation visit prn  - no signs of PP depression  - got depo prior to d/c  - reviewed continued precautions for postpartum recovery       RTC for routine postpartum at 6wks AUDREY Pettit D.O.

## 2024-07-11 ENCOUNTER — POST PARTUM (OUTPATIENT)
Dept: OBGYN | Facility: CLINIC | Age: 24
End: 2024-07-11
Payer: COMMERCIAL

## 2024-07-11 VITALS — BODY MASS INDEX: 44.63 KG/M2 | SYSTOLIC BLOOD PRESSURE: 118 MMHG | WEIGHT: 260 LBS | DIASTOLIC BLOOD PRESSURE: 78 MMHG

## 2024-07-11 DIAGNOSIS — Z98.891 S/P C-SECTION: ICD-10-CM

## 2024-07-11 DIAGNOSIS — N81.89 PELVIC FLOOR WEAKNESS: ICD-10-CM

## 2024-07-11 PROCEDURE — 3074F SYST BP LT 130 MM HG: CPT | Performed by: OBSTETRICS & GYNECOLOGY

## 2024-07-11 PROCEDURE — 0503F POSTPARTUM CARE VISIT: CPT | Performed by: OBSTETRICS & GYNECOLOGY

## 2024-07-11 PROCEDURE — 3078F DIAST BP <80 MM HG: CPT | Performed by: OBSTETRICS & GYNECOLOGY

## 2024-07-11 ASSESSMENT — EDINBURGH POSTNATAL DEPRESSION SCALE (EPDS)
TOTAL SCORE: 16
I HAVE BEEN SO UNHAPPY THAT I HAVE HAD DIFFICULTY SLEEPING: YES, SOMETIMES
I HAVE FELT SAD OR MISERABLE: YES, QUITE OFTEN
I HAVE BEEN ANXIOUS OR WORRIED FOR NO GOOD REASON: YES, VERY OFTEN
I HAVE BLAMED MYSELF UNNECESSARILY WHEN THINGS WENT WRONG: YES, SOME OF THE TIME
I HAVE FELT SCARED OR PANICKY FOR NO GOOD REASON: YES, SOMETIMES
THINGS HAVE BEEN GETTING ON TOP OF ME: YES, SOMETIMES I HAVEN'T BEEN COPING AS WELL AS USUAL
I HAVE BEEN SO UNHAPPY THAT I HAVE BEEN CRYING: YES, QUITE OFTEN
THE THOUGHT OF HARMING MYSELF HAS OCCURRED TO ME: NEVER
I HAVE LOOKED FORWARD WITH ENJOYMENT TO THINGS: AS MUCH AS I EVER DID
I HAVE BEEN ABLE TO LAUGH AND SEE THE FUNNY SIDE OF THINGS: NOT QUITE SO MUCH NOW

## 2024-07-11 ASSESSMENT — FIBROSIS 4 INDEX: FIB4 SCORE: 0.4

## 2024-08-22 ENCOUNTER — APPOINTMENT (OUTPATIENT)
Dept: OBGYN | Facility: CLINIC | Age: 24
End: 2024-08-22
Payer: COMMERCIAL

## 2024-08-28 ENCOUNTER — NON-PROVIDER VISIT (OUTPATIENT)
Dept: OBGYN | Facility: CLINIC | Age: 24
End: 2024-08-28
Payer: COMMERCIAL

## 2024-08-28 VITALS — SYSTOLIC BLOOD PRESSURE: 116 MMHG | BODY MASS INDEX: 45.66 KG/M2 | WEIGHT: 266 LBS | DIASTOLIC BLOOD PRESSURE: 74 MMHG

## 2024-08-28 DIAGNOSIS — Z30.42 ENCOUNTER FOR DEPO-PROVERA CONTRACEPTION: ICD-10-CM

## 2024-08-28 PROCEDURE — 96372 THER/PROPH/DIAG INJ SC/IM: CPT | Performed by: STUDENT IN AN ORGANIZED HEALTH CARE EDUCATION/TRAINING PROGRAM

## 2024-08-28 RX ORDER — MEDROXYPROGESTERONE ACETATE 150 MG/ML
150 INJECTION, SUSPENSION INTRAMUSCULAR ONCE
Status: COMPLETED | OUTPATIENT
Start: 2024-08-28 | End: 2024-08-28

## 2024-08-28 RX ADMIN — MEDROXYPROGESTERONE ACETATE 150 MG: 150 INJECTION, SUSPENSION INTRAMUSCULAR at 15:01

## 2024-08-28 ASSESSMENT — FIBROSIS 4 INDEX: FIB4 SCORE: 0.42

## 2024-08-28 NOTE — PROGRESS NOTES
Date of last Depo Provera Injection : 7/11/2024  Current date within therapeutic range? :  Yes  Urine pregnancy test done (needed if out of date range) :   Date of office last visit : 7/11/2024  Date of last pap (if > 21 years old)/ GYN exam : 12/13/2023 wnl  Dx: Contraceptive use   Order and dose verified by : Scanner  Consent form signed ? : Yes  Patient tolerated injection and no adverse effects were observed or reported : Yes pt tolerated  # of Administrations remaining in MAR :   Next injection due between : Nov 13- Nov 27  Given in :R Deltoid

## 2024-10-24 ENCOUNTER — HOSPITAL ENCOUNTER (EMERGENCY)
Facility: MEDICAL CENTER | Age: 24
End: 2024-10-25
Attending: STUDENT IN AN ORGANIZED HEALTH CARE EDUCATION/TRAINING PROGRAM
Payer: COMMERCIAL

## 2024-10-24 ENCOUNTER — GYNECOLOGY VISIT (OUTPATIENT)
Dept: OBGYN | Facility: CLINIC | Age: 24
End: 2024-10-24
Payer: COMMERCIAL

## 2024-10-24 VITALS — DIASTOLIC BLOOD PRESSURE: 90 MMHG | BODY MASS INDEX: 45.83 KG/M2 | WEIGHT: 267 LBS | SYSTOLIC BLOOD PRESSURE: 146 MMHG

## 2024-10-24 DIAGNOSIS — G43.909 MIGRAINE WITHOUT STATUS MIGRAINOSUS, NOT INTRACTABLE, UNSPECIFIED MIGRAINE TYPE: ICD-10-CM

## 2024-10-24 DIAGNOSIS — R55 SYNCOPE, UNSPECIFIED SYNCOPE TYPE: ICD-10-CM

## 2024-10-24 DIAGNOSIS — S50.311A ABRASION OF RIGHT ELBOW, INITIAL ENCOUNTER: ICD-10-CM

## 2024-10-24 DIAGNOSIS — Z30.017 NEXPLANON INSERTION: Primary | ICD-10-CM

## 2024-10-24 LAB — EKG IMPRESSION: NORMAL

## 2024-10-24 PROCEDURE — 99284 EMERGENCY DEPT VISIT MOD MDM: CPT

## 2024-10-24 PROCEDURE — 84484 ASSAY OF TROPONIN QUANT: CPT

## 2024-10-24 PROCEDURE — 85025 COMPLETE CBC W/AUTO DIFF WBC: CPT

## 2024-10-24 PROCEDURE — 36415 COLL VENOUS BLD VENIPUNCTURE: CPT

## 2024-10-24 PROCEDURE — 84703 CHORIONIC GONADOTROPIN ASSAY: CPT

## 2024-10-24 PROCEDURE — 700111 HCHG RX REV CODE 636 W/ 250 OVERRIDE (IP): Mod: JZ | Performed by: STUDENT IN AN ORGANIZED HEALTH CARE EDUCATION/TRAINING PROGRAM

## 2024-10-24 PROCEDURE — 80053 COMPREHEN METABOLIC PANEL: CPT

## 2024-10-24 PROCEDURE — 96374 THER/PROPH/DIAG INJ IV PUSH: CPT

## 2024-10-24 PROCEDURE — 96375 TX/PRO/DX INJ NEW DRUG ADDON: CPT

## 2024-10-24 PROCEDURE — 93005 ELECTROCARDIOGRAM TRACING: CPT | Performed by: STUDENT IN AN ORGANIZED HEALTH CARE EDUCATION/TRAINING PROGRAM

## 2024-10-24 PROCEDURE — 11981 INSERTION DRUG DLVR IMPLANT: CPT | Performed by: OBSTETRICS & GYNECOLOGY

## 2024-10-24 RX ORDER — PROCHLORPERAZINE EDISYLATE 5 MG/ML
10 INJECTION INTRAMUSCULAR; INTRAVENOUS ONCE
Status: COMPLETED | OUTPATIENT
Start: 2024-10-25 | End: 2024-10-24

## 2024-10-24 RX ORDER — DIPHENHYDRAMINE HYDROCHLORIDE 50 MG/ML
25 INJECTION INTRAMUSCULAR; INTRAVENOUS ONCE
Status: COMPLETED | OUTPATIENT
Start: 2024-10-25 | End: 2024-10-24

## 2024-10-24 RX ADMIN — PROCHLORPERAZINE EDISYLATE 10 MG: 5 INJECTION INTRAMUSCULAR; INTRAVENOUS at 23:51

## 2024-10-24 RX ADMIN — DIPHENHYDRAMINE HYDROCHLORIDE 25 MG: 50 INJECTION, SOLUTION INTRAMUSCULAR; INTRAVENOUS at 23:52

## 2024-10-24 ASSESSMENT — FIBROSIS 4 INDEX
FIB4 SCORE: 0.42
FIB4 SCORE: 0.42

## 2024-10-24 ASSESSMENT — PAIN DESCRIPTION - PAIN TYPE: TYPE: ACUTE PAIN

## 2024-10-25 VITALS
TEMPERATURE: 98.1 F | RESPIRATION RATE: 18 BRPM | HEIGHT: 64 IN | BODY MASS INDEX: 44.53 KG/M2 | HEART RATE: 76 BPM | OXYGEN SATURATION: 99 % | WEIGHT: 260.8 LBS | SYSTOLIC BLOOD PRESSURE: 132 MMHG | DIASTOLIC BLOOD PRESSURE: 72 MMHG

## 2024-10-25 LAB
ALBUMIN SERPL BCP-MCNC: 4.3 G/DL (ref 3.2–4.9)
ALBUMIN/GLOB SERPL: 1.6 G/DL
ALP SERPL-CCNC: 87 U/L (ref 30–99)
ALT SERPL-CCNC: 32 U/L (ref 2–50)
ANION GAP SERPL CALC-SCNC: 15 MMOL/L (ref 7–16)
AST SERPL-CCNC: 18 U/L (ref 12–45)
BASOPHILS # BLD AUTO: 1 % (ref 0–1.8)
BASOPHILS # BLD: 0.08 K/UL (ref 0–0.12)
BILIRUB SERPL-MCNC: 0.2 MG/DL (ref 0.1–1.5)
BUN SERPL-MCNC: 15 MG/DL (ref 8–22)
CALCIUM ALBUM COR SERPL-MCNC: 9 MG/DL (ref 8.5–10.5)
CALCIUM SERPL-MCNC: 9.2 MG/DL (ref 8.4–10.2)
CHLORIDE SERPL-SCNC: 105 MMOL/L (ref 96–112)
CO2 SERPL-SCNC: 18 MMOL/L (ref 20–33)
CREAT SERPL-MCNC: 0.66 MG/DL (ref 0.5–1.4)
EOSINOPHIL # BLD AUTO: 0.3 K/UL (ref 0–0.51)
EOSINOPHIL NFR BLD: 3.8 % (ref 0–6.9)
ERYTHROCYTE [DISTWIDTH] IN BLOOD BY AUTOMATED COUNT: 45.9 FL (ref 35.9–50)
GFR SERPLBLD CREATININE-BSD FMLA CKD-EPI: 127 ML/MIN/1.73 M 2
GLOBULIN SER CALC-MCNC: 2.7 G/DL (ref 1.9–3.5)
GLUCOSE SERPL-MCNC: 93 MG/DL (ref 65–99)
HCG SERPL QL: NEGATIVE
HCT VFR BLD AUTO: 41.5 % (ref 37–47)
HGB BLD-MCNC: 13.7 G/DL (ref 12–16)
IMM GRANULOCYTES # BLD AUTO: 0.11 K/UL (ref 0–0.11)
IMM GRANULOCYTES NFR BLD AUTO: 1.4 % (ref 0–0.9)
LYMPHOCYTES # BLD AUTO: 3.34 K/UL (ref 1–4.8)
LYMPHOCYTES NFR BLD: 42 % (ref 22–41)
MCH RBC QN AUTO: 27.8 PG (ref 27–33)
MCHC RBC AUTO-ENTMCNC: 33 G/DL (ref 32.2–35.5)
MCV RBC AUTO: 84.2 FL (ref 81.4–97.8)
MONOCYTES # BLD AUTO: 0.57 K/UL (ref 0–0.85)
MONOCYTES NFR BLD AUTO: 7.2 % (ref 0–13.4)
NEUTROPHILS # BLD AUTO: 3.56 K/UL (ref 1.82–7.42)
NEUTROPHILS NFR BLD: 44.6 % (ref 44–72)
NRBC # BLD AUTO: 0 K/UL
NRBC BLD-RTO: 0 /100 WBC (ref 0–0.2)
PLATELET # BLD AUTO: 318 K/UL (ref 164–446)
PMV BLD AUTO: 10.3 FL (ref 9–12.9)
POTASSIUM SERPL-SCNC: 4 MMOL/L (ref 3.6–5.5)
PROT SERPL-MCNC: 7 G/DL (ref 6–8.2)
RBC # BLD AUTO: 4.93 M/UL (ref 4.2–5.4)
SODIUM SERPL-SCNC: 138 MMOL/L (ref 135–145)
TROPONIN T SERPL-MCNC: <6 NG/L (ref 6–19)
WBC # BLD AUTO: 8 K/UL (ref 4.8–10.8)

## 2025-04-18 ENCOUNTER — GYNECOLOGY VISIT (OUTPATIENT)
Dept: OBGYN | Facility: CLINIC | Age: 25
End: 2025-04-18
Payer: COMMERCIAL

## 2025-04-18 VITALS
DIASTOLIC BLOOD PRESSURE: 89 MMHG | SYSTOLIC BLOOD PRESSURE: 132 MMHG | HEIGHT: 64 IN | BODY MASS INDEX: 44.05 KG/M2 | WEIGHT: 258 LBS

## 2025-04-18 DIAGNOSIS — Z30.46 ENCOUNTER FOR SURVEILLANCE OF NEXPLANON SUBDERMAL CONTRACEPTIVE: Primary | ICD-10-CM

## 2025-04-18 DIAGNOSIS — M79.602 PAIN OF LEFT UPPER EXTREMITY: ICD-10-CM

## 2025-04-18 PROCEDURE — 3075F SYST BP GE 130 - 139MM HG: CPT | Performed by: OBSTETRICS & GYNECOLOGY

## 2025-04-18 PROCEDURE — 99213 OFFICE O/P EST LOW 20 MIN: CPT | Performed by: OBSTETRICS & GYNECOLOGY

## 2025-04-18 PROCEDURE — 3079F DIAST BP 80-89 MM HG: CPT | Performed by: OBSTETRICS & GYNECOLOGY

## 2025-04-18 ASSESSMENT — FIBROSIS 4 INDEX: FIB4 SCORE: 0.22

## 2025-04-18 NOTE — PROGRESS NOTES
"[facility]  GYNECOLOGY VISIT    CC:  Gynecologic Exam (Nex check )       HPI: Patient is a 24 y.o.  who presents for follow up for nexplanon.    Reports that Nexplanon was placed without issue in her left arm in 2024.  She was doing well with it, however over the last few weeks has noticed that the Nexplanon seems to have moved more distally in her arm and is now causing her pain especially with any or on it or certain movements.  She noticed a huge bruise that developed over the distal aspect of the Nexplanon in the upper left extremity a few weeks ago.  Denies any specific trauma to the area.    Denies weakness of the upper extremity or digits.        ROS:   General: denies fever / chills  HEENT: denies sore throat  CV: denies chest pain  Repiratory: denies shortness of breath  GI: denies abdominal pain  : denies dysuria    PFSH:  I personally reviewed the past medical and surgical histories. Any changes have been updated in the chart.      PHYSICAL EXAMINATION:  Vital Signs:   Vitals:    25 1514   BP: 132/89   BP Location: Right arm   Patient Position: Sitting   BP Cuff Size: Large adult long   Weight: 258 lb   Height: 5' 4\"     Body mass index is 44.29 kg/m².    Gen: appears well, NAD  Respiratory: normal effort  Extrem: Upper extremity with approximate 3 x 3 hematoma approximately 2 cm proximal to the lateral epicondyle.  When on minimally palpable in the left upper extremity.  Aspect of the Nexplanon palpable just at the level of the bruise remainder of Nexplanon nonpalpable.    Upper extremity US:  Contraceptive implant noted to be located  above the fascia, 2-3 mm beneath the skin in the left arm. No other abnormalities noted.     ASSESSMENT AND PLAN:  24 y.o.      # Minimally palpable Nexplanon  # Pain and bruising of upper extremity  - Nexplanon appears to be in usual location of the upper extremity and palpable, deeper than desired or recommended.  There is to have " possibly migrated due to a piece of Nexplanon. Given pain and bruising, I do recommend removal.  However given depth, will require a deep Nexplanon removal procedure.   - That Nexplanon is still effective for birth control in this location    Patient will return to clinic for deep Nexplanon removal.  Questions answered      Amy Sandra MD  Obstetrics and Gynecology

## 2025-04-18 NOTE — PROGRESS NOTES
Patient here for GYN visit - NEX check   Last seen on : 10/24/24 w/ Dr. Reich  LMP : Irreg 4/11/25  BCM : 10/2024 NEX inserted  Pap : 12/13/23 NILM (-)   Phone/Pharmacy verified: [phone]     Pt states her Nex is sticking out and hurting here and there. She has a bruise as well. She is having 2 periods a month almost, one normal heavy one and then some days with some light bleeding and spotting

## 2025-04-21 SDOH — ECONOMIC STABILITY: TRANSPORTATION INSECURITY
IN THE PAST 12 MONTHS, HAS LACK OF TRANSPORTATION KEPT YOU FROM MEETINGS, WORK, OR FROM GETTING THINGS NEEDED FOR DAILY LIVING?: NO

## 2025-04-21 SDOH — HEALTH STABILITY: PHYSICAL HEALTH: ON AVERAGE, HOW MANY MINUTES DO YOU ENGAGE IN EXERCISE AT THIS LEVEL?: 30 MIN

## 2025-04-21 SDOH — ECONOMIC STABILITY: INCOME INSECURITY: IN THE LAST 12 MONTHS, WAS THERE A TIME WHEN YOU WERE NOT ABLE TO PAY THE MORTGAGE OR RENT ON TIME?: NO

## 2025-04-21 SDOH — HEALTH STABILITY: MENTAL HEALTH
STRESS IS WHEN SOMEONE FEELS TENSE, NERVOUS, ANXIOUS, OR CAN'T SLEEP AT NIGHT BECAUSE THEIR MIND IS TROUBLED. HOW STRESSED ARE YOU?: VERY MUCH

## 2025-04-21 SDOH — HEALTH STABILITY: PHYSICAL HEALTH: ON AVERAGE, HOW MANY DAYS PER WEEK DO YOU ENGAGE IN MODERATE TO STRENUOUS EXERCISE (LIKE A BRISK WALK)?: 4 DAYS

## 2025-04-21 SDOH — ECONOMIC STABILITY: TRANSPORTATION INSECURITY
IN THE PAST 12 MONTHS, HAS THE LACK OF TRANSPORTATION KEPT YOU FROM MEDICAL APPOINTMENTS OR FROM GETTING MEDICATIONS?: NO

## 2025-04-21 SDOH — ECONOMIC STABILITY: FOOD INSECURITY: WITHIN THE PAST 12 MONTHS, THE FOOD YOU BOUGHT JUST DIDN'T LAST AND YOU DIDN'T HAVE MONEY TO GET MORE.: NEVER TRUE

## 2025-04-21 SDOH — ECONOMIC STABILITY: TRANSPORTATION INSECURITY
IN THE PAST 12 MONTHS, HAS LACK OF RELIABLE TRANSPORTATION KEPT YOU FROM MEDICAL APPOINTMENTS, MEETINGS, WORK OR FROM GETTING THINGS NEEDED FOR DAILY LIVING?: NO

## 2025-04-21 SDOH — ECONOMIC STABILITY: FOOD INSECURITY: WITHIN THE PAST 12 MONTHS, YOU WORRIED THAT YOUR FOOD WOULD RUN OUT BEFORE YOU GOT MONEY TO BUY MORE.: NEVER TRUE

## 2025-04-21 SDOH — ECONOMIC STABILITY: INCOME INSECURITY: HOW HARD IS IT FOR YOU TO PAY FOR THE VERY BASICS LIKE FOOD, HOUSING, MEDICAL CARE, AND HEATING?: NOT HARD AT ALL

## 2025-04-21 SDOH — ECONOMIC STABILITY: HOUSING INSECURITY
IN THE LAST 12 MONTHS, WAS THERE A TIME WHEN YOU DID NOT HAVE A STEADY PLACE TO SLEEP OR SLEPT IN A SHELTER (INCLUDING NOW)?: NO

## 2025-04-21 ASSESSMENT — SOCIAL DETERMINANTS OF HEALTH (SDOH)
HOW OFTEN DO YOU ATTENT MEETINGS OF THE CLUB OR ORGANIZATION YOU BELONG TO?: PATIENT DECLINED
WITHIN THE PAST 12 MONTHS, YOU WORRIED THAT YOUR FOOD WOULD RUN OUT BEFORE YOU GOT THE MONEY TO BUY MORE: NEVER TRUE
HOW OFTEN DO YOU HAVE SIX OR MORE DRINKS ON ONE OCCASION: NEVER
ARE YOU MARRIED, WIDOWED, DIVORCED, SEPARATED, NEVER MARRIED, OR LIVING WITH A PARTNER?: LIVING WITH PARTNER
HOW OFTEN DO YOU GET TOGETHER WITH FRIENDS OR RELATIVES?: TWICE A WEEK
ARE YOU MARRIED, WIDOWED, DIVORCED, SEPARATED, NEVER MARRIED, OR LIVING WITH A PARTNER?: LIVING WITH PARTNER
HOW MANY DRINKS CONTAINING ALCOHOL DO YOU HAVE ON A TYPICAL DAY WHEN YOU ARE DRINKING: 1 OR 2
IN A TYPICAL WEEK, HOW MANY TIMES DO YOU TALK ON THE PHONE WITH FAMILY, FRIENDS, OR NEIGHBORS?: MORE THAN THREE TIMES A WEEK
IN A TYPICAL WEEK, HOW MANY TIMES DO YOU TALK ON THE PHONE WITH FAMILY, FRIENDS, OR NEIGHBORS?: MORE THAN THREE TIMES A WEEK
IN THE PAST 12 MONTHS, HAS THE ELECTRIC, GAS, OIL, OR WATER COMPANY THREATENED TO SHUT OFF SERVICE IN YOUR HOME?: NO
HOW OFTEN DO YOU ATTENT MEETINGS OF THE CLUB OR ORGANIZATION YOU BELONG TO?: PATIENT DECLINED
HOW OFTEN DO YOU HAVE A DRINK CONTAINING ALCOHOL: MONTHLY OR LESS
HOW OFTEN DO YOU ATTEND CHURCH OR RELIGIOUS SERVICES?: NEVER
HOW OFTEN DO YOU ATTEND CHURCH OR RELIGIOUS SERVICES?: NEVER
HOW HARD IS IT FOR YOU TO PAY FOR THE VERY BASICS LIKE FOOD, HOUSING, MEDICAL CARE, AND HEATING?: NOT HARD AT ALL
HOW OFTEN DO YOU GET TOGETHER WITH FRIENDS OR RELATIVES?: TWICE A WEEK
DO YOU BELONG TO ANY CLUBS OR ORGANIZATIONS SUCH AS CHURCH GROUPS UNIONS, FRATERNAL OR ATHLETIC GROUPS, OR SCHOOL GROUPS?: NO
DO YOU BELONG TO ANY CLUBS OR ORGANIZATIONS SUCH AS CHURCH GROUPS UNIONS, FRATERNAL OR ATHLETIC GROUPS, OR SCHOOL GROUPS?: NO

## 2025-04-21 ASSESSMENT — LIFESTYLE VARIABLES
SKIP TO QUESTIONS 9-10: 1
HOW OFTEN DO YOU HAVE A DRINK CONTAINING ALCOHOL: MONTHLY OR LESS
AUDIT-C TOTAL SCORE: 1
HOW MANY STANDARD DRINKS CONTAINING ALCOHOL DO YOU HAVE ON A TYPICAL DAY: 1 OR 2
HOW OFTEN DO YOU HAVE SIX OR MORE DRINKS ON ONE OCCASION: NEVER

## 2025-04-24 ENCOUNTER — OFFICE VISIT (OUTPATIENT)
Dept: MEDICAL GROUP | Age: 25
End: 2025-04-24
Payer: COMMERCIAL

## 2025-04-24 VITALS
OXYGEN SATURATION: 98 % | HEIGHT: 64 IN | TEMPERATURE: 98.4 F | BODY MASS INDEX: 44.69 KG/M2 | HEART RATE: 95 BPM | SYSTOLIC BLOOD PRESSURE: 120 MMHG | WEIGHT: 261.8 LBS | DIASTOLIC BLOOD PRESSURE: 70 MMHG

## 2025-04-24 DIAGNOSIS — G43.109 MIGRAINE WITH AURA AND WITHOUT STATUS MIGRAINOSUS, NOT INTRACTABLE: ICD-10-CM

## 2025-04-24 DIAGNOSIS — E66.813 CLASS 3 SEVERE OBESITY DUE TO EXCESS CALORIES WITHOUT SERIOUS COMORBIDITY WITH BODY MASS INDEX (BMI) OF 40.0 TO 44.9 IN ADULT: ICD-10-CM

## 2025-04-24 DIAGNOSIS — R14.0 ABDOMINAL BLOATING: ICD-10-CM

## 2025-04-24 DIAGNOSIS — M25.20 JOINT LAXITY: ICD-10-CM

## 2025-04-24 DIAGNOSIS — M62.08 DIASTASIS RECTI: ICD-10-CM

## 2025-04-24 DIAGNOSIS — N92.0 MENORRHAGIA WITH REGULAR CYCLE: ICD-10-CM

## 2025-04-24 PROBLEM — I10 HYPERTENSION: Status: RESOLVED | Noted: 2024-05-27 | Resolved: 2025-04-24

## 2025-04-24 PROBLEM — O99.210 OBESITY IN PREGNANCY: Status: RESOLVED | Noted: 2023-12-13 | Resolved: 2025-04-24

## 2025-04-24 PROBLEM — O30.049 DICHORIONIC DIAMNIOTIC TWIN GESTATION: Status: RESOLVED | Noted: 2023-11-16 | Resolved: 2025-04-24

## 2025-04-24 PROCEDURE — 3078F DIAST BP <80 MM HG: CPT | Performed by: STUDENT IN AN ORGANIZED HEALTH CARE EDUCATION/TRAINING PROGRAM

## 2025-04-24 PROCEDURE — 3074F SYST BP LT 130 MM HG: CPT | Performed by: STUDENT IN AN ORGANIZED HEALTH CARE EDUCATION/TRAINING PROGRAM

## 2025-04-24 PROCEDURE — 99204 OFFICE O/P NEW MOD 45 MIN: CPT | Performed by: STUDENT IN AN ORGANIZED HEALTH CARE EDUCATION/TRAINING PROGRAM

## 2025-04-24 ASSESSMENT — PATIENT HEALTH QUESTIONNAIRE - PHQ9
5. POOR APPETITE OR OVEREATING: 0 - NOT AT ALL
CLINICAL INTERPRETATION OF PHQ2 SCORE: 2
SUM OF ALL RESPONSES TO PHQ QUESTIONS 1-9: 2

## 2025-04-24 ASSESSMENT — FIBROSIS 4 INDEX: FIB4 SCORE: 0.22

## 2025-04-24 NOTE — PROGRESS NOTES
Verbal consent was acquired by the patient to use [employer] ambient listening note generation during this visit     Subjective:     HPI:   History of Present Illness  The patient is a 24-year-old female who presents to [facility] care.    She reports experiencing significant gastrointestinal disturbances, including persistent vomiting of stomach acid and undigested food particles, alternating episodes of diarrhea and constipation lasting 4 to 5 days, and severe bloating after meals. No blood has been observed in her stool. Her diet is irregular due to shift work, often eating at night and skipping meals during the day. Dietary modifications have been made, including increased vegetable intake and reduced consumption of bread, pasta, chips, candy, and bakery items. [place] has been eliminated from her diet due to its adverse effects on her stomach. She has been diagnosed with GERD and has been on omeprazole for several years.    Joint hypermobility is reported, which has been present since childhood and is now manifesting as frequent dislocations, particularly in her toes, fingers, knees, hips, and shoulders. A history of joint issues dates back to age 8. Despite a negative test for rheumatoid arthritis 2.5 years ago, symptoms persist. Weight loss has been advised, and she has been working out when possible, gaining muscle and changing her body composition slightly. She is 5 feet 4 inches tall. In her late teens, she weighed 160 pounds. 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 Frequent joint sprains, particularly in her ankles and wrists, and daily subluxations of her shoulders and hips are reported. Fingers and toes dislocate a few times a month. Despite building muscle through her work and weightlifting, severe pain and immobility are still experienced on some days. Easy bruising and hyperextension of her knees and elbows are noted. A history of constant ankle and wrist sprains in  childhood and pulled hip flexors as a teenager is mentioned.    Migraines have been present since age 14, becoming less frequent but more severe. A blackout and fall occurred a few months ago. Vision disturbances occur during migraines, with the left eye sometimes ceasing to function, though this is infrequent. Auras are experienced, and migraines typically last less than 15 days a month and do not persist for more than 3 consecutive days. Episodes start with altered perception for a day, followed by a day of severe headache, and then a day of moderate headache that allows functioning. Amitriptyline has been discontinued due to worsening migraines, and sumatriptan has not been taken recently due to lack of efficacy. Relief is found with Tylenol, ibuprofen, and Excedrin Migraine.    Heavy menstrual periods are reported, with two episodes last month. Tampons cannot be used due to heavy bleeding, and a cup is used instead. Depo-Provera was used from ages 18 to 22, during which time menstruation ceased. Resuming Depo-Provera is considered but concerns about potential bone effects are noted.    A Nexplanon implant for contraception is causing pain due to its position under the skin. Removal is planned for next week, with no immediate replacement intended, opting for condom use instead.    A  for twins was performed 11 months ago, followed by preeclampsia postpartum, requiring antihypertensive medication at discharge. The medication was discontinued due to adverse effects, and normal blood pressure is currently maintained.    Persistent diastasis recti from pregnancy is reported, with no significant improvement. It may contribute to bloating but does not cause pain or hernias. Physical therapy has not been sought for this condition.    She has not had chickenpox as a child. All childhood vaccines were received. Hepatitis B vaccine is up to date. Tdap is believed to be overdue.    GYNECOLOGICAL HISTORY:  -  "Frequency and Flow: Heavy menstrual periods, with two episodes last month  - Menstrual Pain: Severe cramps, worse than labor    PAST SURGICAL HISTORY:  -  for twins 2024    SOCIAL HISTORY  She works as a CNA.    FAMILY HISTORY  Her maternal grandmother had breast cancer in her 30s or 40s and has had multiple rounds of skin cancer. Her maternal grandmother's sister had colon and uterine cancer. Her great-grandmother on her mother's side had skin, uterine, and breast cancer. There is a history of uterine, breast, and skin cancer on her mother's side.    Health Maintenance: Completed  No n/v/d/c, fever, chills, sob, chest pain      Objective:     Exam:  /70 (BP Location: Left arm, Patient Position: Sitting, BP Cuff Size: Large adult)   Pulse 95   Temp 36.9 °C (98.4 °F) (Temporal)   Ht 1.626 m (5' 4\")   Wt 119 kg (261 lb 12.8 oz)   LMP 2025 (Exact Date)   SpO2 98%   BMI 44.94 kg/m²  Body mass index is 44.94 kg/m².    Physical Exam  Gen: nad  HENT: ncat, EOMI  Resp: ctabl  Cardiac: rrr, no m  GI: nt/nd. diastasis recti   MSK: hyperextension of joints of finger, wrists and knees  Neuro: no focal deficits, cn 2-12 intact throughout, sensation to light touch intact throughout  Psych: appropriate mood and affect        Results  Labs   - Rheumatoid arthritis test: Negative    Assessment & Plan:     1. Diastasis recti        2. Migraine with aura and without status migrainosus, not intractable  CBC WITHOUT DIFFERENTIAL    Comp Metabolic Panel    HEMOGLOBIN A1C    Lipid Profile    TSH WITH REFLEX TO FT4    VITAMIN D,25 HYDROXY (DEFICIENCY)    VITAMIN B12      3. Abdominal bloating  Patient identified as having weight management issue.  Appropriate orders and counseling given.    Referral to Nutrition Services    TRANSGLUTAMINASE ANTIBODIES    IGA SERUM QUANT      4. Class 3 severe obesity due to excess calories without serious comorbidity with body mass index (BMI) of 40.0 to 44.9 in adult  " Patient identified as having weight management issue.  Appropriate orders and counseling given.    Referral to Nutrition Services    CBC WITHOUT DIFFERENTIAL    Comp Metabolic Panel    HEMOGLOBIN A1C    Lipid Profile    TSH WITH REFLEX TO FT4    VITAMIN D,25 HYDROXY (DEFICIENCY)    VITAMIN B12      5. Menorrhagia with regular cycle        6. Joint laxity  Referral to Sports Medicine          Assessment & Plan  1. Gastroesophageal Reflux Disease (GERD).  - Symptoms include throwing up stomach acid with food chunks multiple times a day, diarrhea or constipation, and significant bloating after eating.  - GERD is likely exacerbated by recent twin pregnancy and .  - Consultation with a nutritionist is recommended to discuss dietary modifications. Celiac test will be conducted. Routine labs will be ordered, including A1c, cholesterol, thyroid function tests, and vitamin D levels.    2. Joint Hypermobility.  - Symptoms include joint subluxations, frequent dislocations, and hyperextension of knees and elbows.  - Physical exam shows hypermobility in various joints, including fingers, toes, knees, hips, and shoulders.  - Referral to sports medicine for further evaluation and monitoring for potential complications.    3. Migraines.  - Reports that amitriptyline worsened migraines, leading to discontinuation. Migraines occur less than 15 days a month, sometimes affecting vision.  - Physical exam findings include normal neurological assessment.  - Advised to continue using over-the-counter medications like Tylenol, ibuprofen, and Excedrin Migraine as needed.    4. Heavy Menstrual Bleeding.  - Experiences heavy periods, sometimes twice a month, leading to dizziness and lightheadedness.  - History of Depo-Provera use from age 18 to 22, with plans to discuss potential reinitiation with OB/GYN.  - Advised to monitor for anemia and low iron levels, which can cause headaches.    5. Contraception.  - Currently has a Nexplanon  implant causing pain and will be removed next week.  - Plans to use condoms temporarily until deciding on another method.    6. Diastasis Recti.  - Substantial split in abdominal muscles from pregnancy, not significantly improving.  - Gentle exercises to strengthen core muscles are recommended.    7. Health Maintenance.  - Up to date on hepatitis B vaccine. Received Tdap vaccine during pregnancy.              Return in about 3 months (around 7/24/2025) for lab results, joint laxity.    Please note that this dictation was created using voice recognition software. I have made every reasonable attempt to correct obvious errors, but I expect that there are errors of grammar and possibly content that I did not discover before finalizing the note.

## 2025-04-30 ENCOUNTER — GYNECOLOGY VISIT (OUTPATIENT)
Dept: OBGYN | Facility: CLINIC | Age: 25
End: 2025-04-30
Payer: COMMERCIAL

## 2025-04-30 VITALS
WEIGHT: 256.6 LBS | HEIGHT: 64 IN | BODY MASS INDEX: 43.81 KG/M2 | DIASTOLIC BLOOD PRESSURE: 89 MMHG | SYSTOLIC BLOOD PRESSURE: 137 MMHG

## 2025-04-30 DIAGNOSIS — M79.602 PAIN OF LEFT UPPER EXTREMITY: ICD-10-CM

## 2025-04-30 DIAGNOSIS — Z30.46 ENCOUNTER FOR NEXPLANON REMOVAL: Primary | ICD-10-CM

## 2025-04-30 DIAGNOSIS — Z30.09 ENCOUNTER FOR COUNSELING REGARDING CONTRACEPTION: ICD-10-CM

## 2025-04-30 DIAGNOSIS — Z30.42 ENCOUNTER FOR DEPO-PROVERA CONTRACEPTION: ICD-10-CM

## 2025-04-30 RX ORDER — MEDROXYPROGESTERONE ACETATE 150 MG/ML
150 INJECTION, SUSPENSION INTRAMUSCULAR ONCE
Status: COMPLETED | OUTPATIENT
Start: 2025-04-30 | End: 2025-04-30

## 2025-04-30 RX ADMIN — MEDROXYPROGESTERONE ACETATE 150 MG: 150 INJECTION, SUSPENSION INTRAMUSCULAR at 13:57

## 2025-04-30 ASSESSMENT — FIBROSIS 4 INDEX: FIB4 SCORE: 0.22

## 2025-04-30 NOTE — PROCEDURES
Deep Contraceptive Implant Removal Note    Date: 25     S: Gertrudis Mata is a 24 y.o.  who presents for removal of her subdermal contraceptive implant.  She has the Nexplanon in her left arm.  She has had the implant in place for 6 month(s).  She desires removal because it has moved and is causing her discomfort. Also having undesired frequent bleeding.      O:   Vitals:   Vitals:    25 1317   BP: 137/89         Extremities - implant minimally palpable in the left arm    Upper extremity US:  Contraceptive implant noted to be located  above the fascia, 2.6 mm beneath the skin in the left arm. No other abnormalities noted.       Procedure indication: non-palpable contraceptive implant, patient desiring removal.    Procedure- Deep contraceptive implant removal under ultrasound guidance   The risks/benefits/and alternatives of removal were discussed with the patient.  Consents were signed and all questions were answered to her satisfaction. An ultrasound was performed and the implant was confirmed to be in the left arm approximately 2.6 mm beneath the skin. She was positioned in the dorsal supine position with her left arm flexed and elevated over her head. The skin was cleansed with betadine.  3 mL of 1 percent lidocaine w/ Epi was infiltrated under the mid-portion of the implant under direct ultrasound guidance.  A small incision was made with an 11 blade scalpel over the midposition of the implant. The implant was grasped with the vasectomy clamp under ultrasound guidance and brought to the skin incision. A curved hemostat was used to also grasp the implant and the implant was removed intact.  Steri-strips and a pressure dressing wereplaced on the incision site.  The patient tolerated the procedure well and without complications.    Assessment/Plan:  Status post deep contraceptive implant removal today under ultrasound guidance.  She was informed that her fertility will return  immediately after implant removal today.  Patient desires to use DMPA for contraception, which was given to her today.  Reviewed normal symptoms and healing and return precautions reviewed.     Amy Sandra MD  Obstetrics and Gynecology

## 2025-04-30 NOTE — PROGRESS NOTES
Pt is here for a GYN procedure - Deep Nexplanon Removal   LMP : 4/18/25 w/ Dr. Sandra   BCM : 10/2024 Nex   Pap : 12/13/23 NILM (-) HPV   Phone/Pharmacy verified: [phone]    Consent Form Signed and in media   Pt states she is still produced quite a bit of milk, she wants to make sure this is okay or if there is anything she can do as she has stopped breast feeding about 8-9 months ago

## 2025-04-30 NOTE — PROGRESS NOTES
"[facility]  GYNECOLOGY VISIT    CC:  Procedure (Deep Nexplanon removal )       HPI: Patient is a 24 y.o.  who presents for follow up for Deep nexplanon removal and contraception discussion.    Pain is stable to slightly worse with in the area of the nexplanon. Wanting to go back on DMPA. Was on this before and liked it.        ROS:   General: denies fever / chills  HEENT: denies sore throat:  CV: denies chest pain:  Repiratory: denies shortness of breath  GI: denies abdominal pain  : denies dysuria:    PFSH:  I personally reviewed the past medical and surgical histories. Any changes have been updated in the chart.      PHYSICAL EXAMINATION:  Vital Signs:   Vitals:    25 1317   BP: 137/89   BP Location: Right arm   Patient Position: Sitting   BP Cuff Size: Large adult long   Weight: 256 lb 9.6 oz   Height: 5' 4\"     Body mass index is 44.05 kg/m².    Gen: appears well, NAD  Respiratory: normal effort  Left extremity: Upper extremity with on minimally palpable in the left upper extremity.  Distal Aspect of the Nexplanon palpable just at the level of the bruise remainder of Nexplanon nonpalpable.  Bruise mostly resolved.    ASSESSMENT AND PLAN:  24 y.o.        1. Encounter for Nexplanon removal  - Non-palpable US guided nexplanon removal completed  - Reviewed removal site care instructions  - Consent for all Surgical, Special Diagnostic or Therapeutic Procedures    2. Contraception Consultation  3. DMPA for contraceptions  - Desires DMPA  - reviewed risks, benefits, alternatives, and side-effects. No contraindications.  - DMPA administered.  - Immediate start okay without UPT given use of nexplanon removed today  - Reviewed 7 days for efficacy.   - Provided next date for DMPA dose.        Amy Sandra MD  Obstetrics and Gynecology      "

## 2025-04-30 NOTE — Clinical Note
REFERRAL APPROVAL NOTICE         Sent on April 30, 2025                   Gertrudis Mata  [address]  [place] [address]                   Dear Ms. Mata,    After a careful review of the medical information and benefit coverage, [facility] has processed your referral. See below for additional details.    If applicable, you must be actively enrolled with your insurance for coverage of the authorized service. If you have any questions regarding your coverage, please contact your insurance directly.    REFERRAL INFORMATION   Referral #:  [id number]  Referred-To Department    Referred-By Provider:  Sports Medicine    Herve Bell M.D.   [address]      [address] [place] [address]  [phone] [address]  [place] [id number] [phone]    Referral Start Date:  04/24/2025  Referral End Date:   04/24/2026             SCHEDULING  If you do not already have an appointment, please call [phone] to make an appointment.     MORE INFORMATION  If you do not already have a [employer] account, sign up at: [email or web address]  You can access your medical information, make appointments, see lab results, billing information, and more.  If you have questions regarding this referral, please contact  the [facility] Referrals department at:             [phone]. Monday - Friday 8:00AM - 5:00PM.     Sincerely,    [facility]

## 2025-05-01 NOTE — Clinical Note
REFERRAL APPROVAL NOTICE         Sent on May 1, 2025                   Gertrudis Mata  [address]  [place] [address]                   Dear Ms. Mata,    After a careful review of the medical information and benefit coverage, [facility] has processed your referral. See below for additional details.    If applicable, you must be actively enrolled with your insurance for coverage of the authorized service. If you have any questions regarding your coverage, please contact your insurance directly.    REFERRAL INFORMATION   Referral #:  [id number]  Referred-To Provider    Referred-By Provider:  ARTHUR Brown CHRISTINE [address] [place] [address]  [phone] [address]  [place] [address]  [phone]    Referral Start Date:  04/24/2025  Referral End Date:   04/24/2026             SCHEDULING  If you do not already have an appointment, please call [phone] to make an appointment.     MORE INFORMATION  If you do not already have a [employer] account, sign up at: [email or web address]  You can access your medical information, make appointments, see lab results, billing information, and more.  If you have questions regarding this referral, please contact  the [facility] Referrals department at:             [phone]. Monday - Friday 8:00AM - 5:00PM.     Sincerely,    [facility]

## (undated) DEVICE — TUBING CLEARLINK DUO-VENT - C-FLO (48EA/CA)

## (undated) DEVICE — HEAD HOLDER JUNIOR/ADULT

## (undated) DEVICE — KIT  I.V. START (100EA/CA)

## (undated) DEVICE — SUTURE 3-0 VICRYL PLUS CT-1 - 36 INCH (36/BX)

## (undated) DEVICE — PACK C-SECTION (2EA/CA)

## (undated) DEVICE — CATHETER IV NON-SAFETY 18 GA X 1 1/4 (50/BX 4BX/CA)

## (undated) DEVICE — GLOVE BIOGEL SZ 6.5 SURGICAL PF LTX (50PR/BX 4BX/CA)

## (undated) DEVICE — SUTURE 1 CHROMIC CTX ETHICON - (36PK/BX)

## (undated) DEVICE — WATER IRRIGATION STERILE 1000ML (12EA/CA)

## (undated) DEVICE — SPONGE GAUZE NON-STERILE 2X2 - 4-PLY (200/PK 40PK/CA)

## (undated) DEVICE — TRAY SPINAL ANESTHESIA NON-SAFETY (10/CA)

## (undated) DEVICE — SET EXTENSION WITH 2 PORTS (48EA/CA) ***PART #2C8610 IS A SUBSTITUTE*****

## (undated) DEVICE — ELECTRODE DUAL RETURN W/ CORD - (50/PK)

## (undated) DEVICE — SUTURE 0 VICRYL PLUS CT-1 - 36 INCH (36/BX)

## (undated) DEVICE — DRESSING ABDOMINAL PAD STERILE 8 X 10" (360EA/CA)"

## (undated) DEVICE — SODIUM CHL IRRIGATION 0.9% 1000ML (12EA/CA)

## (undated) DEVICE — STAPLER SKIN DISP - (6/BX 10BX/CA) VISISTAT